# Patient Record
Sex: FEMALE | Race: WHITE | NOT HISPANIC OR LATINO | Employment: OTHER
[De-identification: names, ages, dates, MRNs, and addresses within clinical notes are randomized per-mention and may not be internally consistent; named-entity substitution may affect disease eponyms.]

---

## 2017-02-08 ENCOUNTER — HOSPITAL (OUTPATIENT)
Dept: OTHER | Age: 57
End: 2017-02-08
Attending: FAMILY MEDICINE

## 2017-03-06 ENCOUNTER — OFFICE VISIT (OUTPATIENT)
Dept: FAMILY MEDICINE CLINIC | Facility: CLINIC | Age: 57
End: 2017-03-06

## 2017-03-06 VITALS
DIASTOLIC BLOOD PRESSURE: 40 MMHG | WEIGHT: 107 LBS | HEIGHT: 63.98 IN | HEART RATE: 78 BPM | SYSTOLIC BLOOD PRESSURE: 100 MMHG | BODY MASS INDEX: 18.27 KG/M2

## 2017-03-06 DIAGNOSIS — K13.70 MOUTH LESION: ICD-10-CM

## 2017-03-06 DIAGNOSIS — E89.0 HISTORY OF PARTIAL THYROIDECTOMY: ICD-10-CM

## 2017-03-06 DIAGNOSIS — Z00.00 WELL FEMALE EXAM WITHOUT GYNECOLOGICAL EXAM: Primary | ICD-10-CM

## 2017-03-06 DIAGNOSIS — N95.1 PERIMENOPAUSAL: ICD-10-CM

## 2017-03-06 DIAGNOSIS — F41.1 GAD (GENERALIZED ANXIETY DISORDER): ICD-10-CM

## 2017-03-06 DIAGNOSIS — Z00.00 LABORATORY EXAMINATION ORDERED AS PART OF A ROUTINE GENERAL MEDICAL EXAMINATION: ICD-10-CM

## 2017-03-06 PROBLEM — H60.549 ECZEMA OF EXTERNAL EAR: Status: ACTIVE | Noted: 2017-03-06

## 2017-03-06 PROCEDURE — 99396 PREV VISIT EST AGE 40-64: CPT | Performed by: FAMILY MEDICINE

## 2017-03-06 PROCEDURE — 99213 OFFICE O/P EST LOW 20 MIN: CPT | Performed by: FAMILY MEDICINE

## 2017-03-06 RX ORDER — PREDNISONE 10 MG/1
TABLET ORAL
COMMUNITY
Start: 2017-01-09 | End: 2017-03-06 | Stop reason: ALTCHOICE

## 2017-03-06 RX ORDER — PREDNISONE 20 MG/1
TABLET ORAL
COMMUNITY
Start: 2016-12-18 | End: 2017-03-06 | Stop reason: ALTCHOICE

## 2017-03-06 RX ORDER — BUSPIRONE HYDROCHLORIDE 10 MG/1
TABLET ORAL
Qty: 90 TABLET | Refills: 1 | Status: SHIPPED | OUTPATIENT
Start: 2017-03-06 | End: 2017-06-05

## 2017-03-06 RX ORDER — HYDROCODONE BITARTRATE AND ACETAMINOPHEN 5; 325 MG/1; MG/1
TABLET ORAL
COMMUNITY
Start: 2017-01-09 | End: 2017-03-06

## 2017-03-06 RX ORDER — NEOMYCIN SULFATE, POLYMYXIN B SULFATE, AND DEXAMETHASONE 3.5; 10000; 1 MG/G; [USP'U]/G; MG/G
OINTMENT OPHTHALMIC
COMMUNITY
Start: 2016-12-18 | End: 2017-03-06

## 2017-03-06 NOTE — PROGRESS NOTES
HPI:   Juana Loya is a 65year old female who presents for a complete physical exam. Symptoms: is menopausal.   Patient complains of congestion related to allergies. She has not been taking any antihistamines. Also feeling anxious and irritable.  HAN POTASSIUM 4.1 3.5 - 5.3 mmol/L   CHLORIDE 103 98 - 110 mmol/L   CARBON DIOXIDE 27 19 - 30 mmol/L   CALCIUM 9.5 8.6 - 10.4 mg/dL   PROTEIN, TOTAL 6.9 6.1 - 8.1 g/dL   ALBUMIN 4.6 3.6 - 5.1 g/dL   GLOBULIN 2.3 1.9 - 3.7 g/dL (calc)   ALBUMIN/GLOBULIN RATIO Hydro Thermal Uterine Ablation; [facility] [place], [place]    SCREENING PAP SMEAR BY PHYS  Approx. 2013    MAMMOGRAM, BOTH BREASTS  Approx.  2013      Family History   Problem Relation Age of Onset   • Liver Failure [Other] [OTHER] Father    • Can nourished and in no apparent distress  SKIN: no rashes,no suspicious lesions  HEENT: atraumatic, normocephalic,ears, nose and throat are normal, mouth with aprox 0.5 cm oval dark macule, unable to remove and non-painful  EYES: PERRLA, EOMI, sclera, conjunc 3-4 times weekly. 2. HAN (generalized anxiety disorder)  BuSpar 5 mg twice daily for 1 week then increase to 10 mg twice daily and if needed after another week increase to 50 mg twice daily for anxiety. Follow-up with update through my chart.   Patient

## 2017-03-06 NOTE — PROGRESS NOTES
An \"Authorization to Use and Disclose Health Information\" was successfully faxed to [address] [place] [address] in order to request copies of her radiology reports. Reta Bumpers is specifically looking for the mammogram and bone density reports, which she had ordered.

## 2017-03-15 ENCOUNTER — TELEPHONE (OUTPATIENT)
Dept: FAMILY MEDICINE CLINIC | Facility: CLINIC | Age: 57
End: 2017-03-15

## 2017-03-15 NOTE — TELEPHONE ENCOUNTER
A message was left on the patient's confidential voicemail notifying the patient that her mammogram was normal and should be repeated in one year. The patient was instructed to call back with any questions.

## 2017-03-18 LAB
ABSOLUTE BASOPHILS: 13 CELLS/UL (ref 0–200)
ABSOLUTE EOSINOPHILS: 142 CELLS/UL (ref 15–500)
ABSOLUTE LYMPHOCYTES: 1380 CELLS/UL (ref 850–3900)
ABSOLUTE MONOCYTES: 258 CELLS/UL (ref 200–950)
ABSOLUTE NEUTROPHILS: 2507 CELLS/UL (ref 1500–7800)
ALBUMIN/GLOBULIN RATIO: 1.8 (CALC) (ref 1–2.5)
ALBUMIN: 4.6 G/DL (ref 3.6–5.1)
ALKALINE PHOSPHATASE: 71 U/L (ref 33–130)
ALT: 17 U/L (ref 6–29)
AST: 20 U/L (ref 10–35)
BASOPHILS: 0.3 %
BILIRUBIN, TOTAL: 0.4 MG/DL (ref 0.2–1.2)
BUN: 20 MG/DL (ref 7–25)
CALCIUM: 9.7 MG/DL (ref 8.6–10.4)
CARBON DIOXIDE: 29 MMOL/L (ref 20–31)
CHLORIDE: 105 MMOL/L (ref 98–110)
CHOL/HDLC RATIO: 2.9 (CALC)
CHOLESTEROL, TOTAL: 204 MG/DL (ref 125–200)
CREATININE: 0.75 MG/DL (ref 0.5–1.05)
EGFR IF AFRICN AM: 103 ML/MIN/1.73M2
EGFR IF NONAFRICN AM: 89 ML/MIN/1.73M2
EOSINOPHILS: 3.3 %
FSH: 89.5 MIU/ML
GLOBULIN: 2.5 G/DL (CALC) (ref 1.9–3.7)
GLUCOSE: 83 MG/DL (ref 65–99)
HDL CHOLESTEROL: 70 MG/DL
HEMATOCRIT: 39.4 % (ref 35–45)
HEMOGLOBIN: 13.2 G/DL (ref 11.7–15.5)
LDL-CHOLESTEROL: 116 MG/DL (CALC)
LH: 36.3 MIU/ML
LYMPHOCYTES: 32.1 %
MCH: 30.9 PG (ref 27–33)
MCHC: 33.5 G/DL (ref 32–36)
MCV: 92.5 FL (ref 80–100)
MONOCYTES: 6 %
MPV: 7.6 FL (ref 7.5–12.5)
NEUTROPHILS: 58.3 %
NON-HDL CHOLESTEROL: 134 MG/DL (CALC)
PLATELET COUNT: 210 THOUSAND/UL (ref 140–400)
POTASSIUM: 4 MMOL/L (ref 3.5–5.3)
PROTEIN, TOTAL: 7.1 G/DL (ref 6.1–8.1)
RDW: 13.6 % (ref 11–15)
RED BLOOD CELL COUNT: 4.26 MILLION/UL (ref 3.8–5.1)
SODIUM: 141 MMOL/L (ref 135–146)
T3, TOTAL: 96 NG/DL (ref 76–181)
T4, FREE: 1.2 NG/DL (ref 0.8–1.8)
TRIGLYCERIDES: 88 MG/DL
TSH: 2.58 MIU/L (ref 0.4–4.5)
VITAMIN D, 25-OH, TOTAL: 23 NG/ML (ref 30–100)
WHITE BLOOD CELL COUNT: 4.3 THOUSAND/UL (ref 3.8–10.8)

## 2017-03-18 NOTE — PROGRESS NOTES
Quick Note:    Lab results showed low Vitamin D. Advise RX vitamin D 50,000 IU once weekly for 12 weeks. Recheck vitamin D level in 3 months. After labs we will direct you on the dose of vitamin D needed OTC.   Elevated FSH and LH consistent with menopause

## 2017-03-20 ENCOUNTER — TELEPHONE (OUTPATIENT)
Dept: FAMILY MEDICINE CLINIC | Facility: CLINIC | Age: 57
End: 2017-03-20

## 2017-03-20 DIAGNOSIS — E55.9 VITAMIN D DEFICIENCY: Primary | ICD-10-CM

## 2017-03-20 RX ORDER — ERGOCALCIFEROL 1.25 MG/1
50000 CAPSULE ORAL WEEKLY
Qty: 12 CAPSULE | Refills: 0 | Status: SHIPPED | OUTPATIENT
Start: 2017-03-20 | End: 2017-06-18

## 2017-03-20 NOTE — TELEPHONE ENCOUNTER
----- Message from Idania Shafer PA-C sent at 3/18/2017  2:29 PM CDT -----  Lab results showed low Vitamin D. Advise RX vitamin D 50,000 IU once weekly for 12 weeks. Recheck vitamin D level in 3 months.   After labs we will direct you on the dose of vi

## 2017-06-05 ENCOUNTER — TELEPHONE (OUTPATIENT)
Dept: FAMILY MEDICINE CLINIC | Facility: CLINIC | Age: 57
End: 2017-06-05

## 2017-06-05 RX ORDER — BUSPIRONE HYDROCHLORIDE 10 MG/1
TABLET ORAL
Qty: 120 TABLET | Refills: 0 | Status: SHIPPED | OUTPATIENT
Start: 2017-06-05 | End: 2017-10-26

## 2017-06-05 NOTE — TELEPHONE ENCOUNTER
Spoke to patient and she said that she has been on the med and will need a refill. But pt feels that the med has not been helping at all. Feels more like a\" placebo\" she said her symptoms are the same. Her last OV was in March 2017.   Did explain that

## 2017-06-05 NOTE — TELEPHONE ENCOUNTER
Spoke to patient and she has been taking 15mg BID so she will go up to 20mg BID and if that dose not effective she will call office to schedule an appt.  Script sent to pharmacy

## 2017-07-11 NOTE — PATIENT INSTRUCTIONS
SCHEDULING [facility] LAB APPOINTMENTS ONLINE    Lab appointments can now be scheduled online at [email or web address]    · Go to [email or web address]  · In Search type Lab  · Click \"Lab services\"  · Click \"Schedule Your Test Online\"  · Follow the prompts  · If you another form of therapy as your treatment needs change. This may mean meeting with a therapist by yourself or in a group.  Therapy can also help you work through problems in your life, such as drug or alcohol dependence, that may be making your anxiety wors take action. But, for some people, anxiety gets so bad it causes problems in daily life. If you find yourself in a constant state of anxiety, you may have an anxiety disorder called generalized anxiety disorder (HAN).  Speak with your doctor or mental healt

## 2017-07-11 NOTE — PROGRESS NOTES
Ita Messina is a 65year old female. HPI:   Patient is in for feeling anxious all the time and irritable had tried BuSpar was up to 20 mg twice a day with no relief.   In the past did use Effexor years ago but was hoping to avoid going on another daily exertion  CARDIOVASCULAR: denies chest pain on exertion  GI: denies abdominal pain and denies heartburn  NEURO: denies headaches  Musculoskeletal: No motor deficits  Psych see above  EXAM:   BP 90/66 (BP Location: Left arm, Patient Position: Sitting, Cuff live over an hour away and would like to communicate via my chart as to how she is feeling the first 2 months. Reviewed benefits and side effects at length. The patient indicates understanding of these issues and agrees to the plan.   The patient is ask

## 2017-07-14 ENCOUNTER — TELEPHONE (OUTPATIENT)
Dept: FAMILY MEDICINE CLINIC | Facility: CLINIC | Age: 57
End: 2017-07-14

## 2017-07-14 NOTE — TELEPHONE ENCOUNTER
A message was left on the patient's confidential voicemail notifying her that her Vitamin D was great at 46. She was advised to take Vitamin D 1,000IU daily OTC in order to maintain her level. The patient was instructed to call back with any questions.

## 2017-10-09 RX ORDER — ESCITALOPRAM OXALATE 10 MG/1
TABLET ORAL
Qty: 30 TABLET | Refills: 2 | Status: SHIPPED
Start: 2017-10-09 | End: 2017-10-26

## 2017-10-09 RX ORDER — ESCITALOPRAM OXALATE 10 MG/1
TABLET ORAL
Qty: 30 TABLET | Refills: 0 | Status: SHIPPED | OUTPATIENT
Start: 2017-10-09 | End: 2017-10-26

## 2017-10-09 NOTE — TELEPHONE ENCOUNTER
From: Brenda Boyd  Sent: 10/9/2017 10:01 AM CDT  Subject: Medication Renewal Request    Brenda Boyd would like a refill of the following medications:     escitalopram 10 MG Oral Tab Angeline Antony PA-C]   Patient Comment: Stephanie Craias, you had ask

## 2017-10-09 NOTE — TELEPHONE ENCOUNTER
lexapro approved qty 30 NR  Patient due for a 3 month f/u  Please call to schedule appt-will need for further refills  [phone] (home)

## 2018-02-16 ENCOUNTER — PATIENT MESSAGE (OUTPATIENT)
Dept: FAMILY MEDICINE CLINIC | Facility: CLINIC | Age: 58
End: 2018-02-16

## 2018-02-18 NOTE — TELEPHONE ENCOUNTER
----- Message from Marlen Sanders sent at 2/16/2018  2:43 PM CST -----  Regarding: Prescription Question  Contact: [phone]  Elisha Triplett,    I'm down to my last few days on the Sertraline and while the existing script has one refill, it seems like this

## 2018-04-06 NOTE — PATIENT INSTRUCTIONS
SCHEDULING [facility] APPOINTMENTS ONLINE    Lab appointments can now be scheduled online at [email or web address]    · Go to [email or web address]  · In Search type Lab  · Click \"Lab services\"  · Click \"Schedule Your Test Online\"  · Follow the prompts  · If you an antibiotic eye drops or ointment, artificial tears, and/or steroid eye drops. Follow all instructions for using these medicines. Use all medicines as directed. If you have pain, take medicine as advised by the healthcare provider.   · Wash your hands car blood) from the eyelid  · Fever of 100.4ºF (38ºC) or higher, or as directed by your healthcare provider  Date Last Reviewed: 10/9/2015  © 5226-4803 [employer]. [facility] [facility], [address] [place] [address]. All rights reserved.  This information

## 2018-04-07 PROBLEM — Z86.69: Status: ACTIVE | Noted: 2018-04-07

## 2018-04-13 ENCOUNTER — TELEPHONE (OUTPATIENT)
Dept: FAMILY MEDICINE CLINIC | Facility: CLINIC | Age: 58
End: 2018-04-13

## 2018-04-13 NOTE — TELEPHONE ENCOUNTER
An \"Authorization to Use and Disclose Health Information\" requesting a copy of the patient's colonoscopy report was successfully faxed to [facility]. Anyi Andrade MD, MPH at [phone].

## 2018-05-15 ENCOUNTER — HOSPITAL (OUTPATIENT)
Dept: OTHER | Age: 58
End: 2018-05-15
Attending: FAMILY MEDICINE

## 2018-09-11 ENCOUNTER — OFFICE VISIT (OUTPATIENT)
Dept: FAMILY MEDICINE CLINIC | Facility: CLINIC | Age: 58
End: 2018-09-11
Payer: COMMERCIAL

## 2018-09-11 VITALS
HEIGHT: 63.82 IN | BODY MASS INDEX: 20.22 KG/M2 | HEART RATE: 80 BPM | SYSTOLIC BLOOD PRESSURE: 92 MMHG | DIASTOLIC BLOOD PRESSURE: 62 MMHG | WEIGHT: 117 LBS

## 2018-09-11 DIAGNOSIS — M85.852 OSTEOPENIA OF LEFT HIP: ICD-10-CM

## 2018-09-11 DIAGNOSIS — Z00.00 LABORATORY EXAMINATION ORDERED AS PART OF A ROUTINE GENERAL MEDICAL EXAMINATION: ICD-10-CM

## 2018-09-11 DIAGNOSIS — Z78.0 POST-MENOPAUSAL: ICD-10-CM

## 2018-09-11 DIAGNOSIS — Z12.4 SCREENING FOR MALIGNANT NEOPLASM OF CERVIX: ICD-10-CM

## 2018-09-11 DIAGNOSIS — Z79.899 MEDICATION MANAGEMENT: ICD-10-CM

## 2018-09-11 DIAGNOSIS — Z01.419 WELL FEMALE EXAM WITH ROUTINE GYNECOLOGICAL EXAM: Primary | ICD-10-CM

## 2018-09-11 DIAGNOSIS — F41.1 GAD (GENERALIZED ANXIETY DISORDER): ICD-10-CM

## 2018-09-11 PROCEDURE — 99213 OFFICE O/P EST LOW 20 MIN: CPT | Performed by: FAMILY MEDICINE

## 2018-09-11 PROCEDURE — 88175 CYTOPATH C/V AUTO FLUID REDO: CPT | Performed by: FAMILY MEDICINE

## 2018-09-11 PROCEDURE — 99396 PREV VISIT EST AGE 40-64: CPT | Performed by: FAMILY MEDICINE

## 2018-09-11 PROCEDURE — 87624 HPV HI-RISK TYP POOLED RSLT: CPT | Performed by: FAMILY MEDICINE

## 2018-09-11 RX ORDER — BIOTIN 1 MG
1 TABLET ORAL DAILY
COMMUNITY

## 2018-09-11 RX ORDER — SERTRALINE HYDROCHLORIDE 100 MG/1
100 TABLET, FILM COATED ORAL DAILY
Qty: 90 TABLET | Refills: 1 | Status: SHIPPED | OUTPATIENT
Start: 2018-09-11 | End: 2019-03-11

## 2018-09-11 NOTE — PROGRESS NOTES
HPI:   Marleny Ramsey is a 63year old female who presents for a complete physical exam. Symptoms: denies discharge, itching, burning or dysuria, is menopausal.  Here for follow-up on generalized anxiety disorder needing a refill on medication.   Taking ser HEMOGLOBIN 13.2 11.7 - 15.5 g/dL    HEMATOCRIT 39.4 35.0 - 45.0 %    MCV 92.5 80.0 - 100.0 fL    MCH 30.9 27.0 - 33.0 pg    MCHC 33.5 32.0 - 36.0 g/dL    RDW 13.6 11.0 - 15.0 %    PLATELET COUNT 944 494 - 400 Thousand/uL    MPV 7.6 7.5 - 12.5 fL    ABSO 0.8 - 1.8 ng/dL          Current Outpatient Medications:  Cholecalciferol (VITAMIN D3) 1000 units Oral Cap Take 1 capsule by mouth daily. Disp:  Rfl:    Sertraline HCl 100 MG Oral Tab Take 1 tablet (100 mg total) by mouth daily.  Disp: 90 tablet Rfl: 1   Nu Tobacco Use      Smoking status: Former Smoker        Types: Cigarettes        Quit date: 1990        Years since quittin.7      Smokeless tobacco: Never Used    Alcohol use:  Yes      Alcohol/week: 4.8 - 7.2 oz      Types: 8 - 12 Glasses of wine p auscultation bilateral, no rales, rhonchi or wheezing  CARDIO: RRR without murmur normal S1S2  ABD:  normal bowel sounds,soft, non tender, no masses, HSM or tenderness  :external labia, introitus and vagina are normal, normal discharge and normal cervix. or any homicidal or suicidal symptoms.   - Sertraline HCl 100 MG Oral Tab; Take 1 tablet (100 mg total) by mouth daily. Dispense: 90 tablet; Refill: 1    3. Post-menopausal  - XR DEXA BONE DENSITOMETRY (CPT=77080);  Future  - VITAMIN D, 25-HYDROXY    4. Os

## 2018-09-12 PROBLEM — Z78.0 POST-MENOPAUSAL: Status: ACTIVE | Noted: 2018-09-12

## 2018-09-12 PROBLEM — K13.70 MOUTH LESION: Status: RESOLVED | Noted: 2017-03-06 | Resolved: 2018-09-12

## 2018-09-12 PROBLEM — M85.852 OSTEOPENIA OF LEFT HIP: Status: ACTIVE | Noted: 2018-09-12

## 2018-09-12 LAB — HPV I/H RISK 1 DNA SPEC QL NAA+PROBE: NEGATIVE

## 2018-09-14 NOTE — PROGRESS NOTES
Pap smear is normal there are some endometrial cells that look benign if any vaginal bleeding would advise pelvic ultrasound call office if any vaginal bleeding occurs.     Sent to my chart

## 2018-09-27 LAB
ABSOLUTE BASOPHILS: 19 CELLS/UL (ref 0–200)
ABSOLUTE EOSINOPHILS: 111 CELLS/UL (ref 15–500)
ABSOLUTE LYMPHOCYTES: 1240 CELLS/UL (ref 850–3900)
ABSOLUTE MONOCYTES: 374 CELLS/UL (ref 200–950)
ABSOLUTE NEUTROPHILS: 1957 CELLS/UL (ref 1500–7800)
ALBUMIN/GLOBULIN RATIO: 1.9 (CALC) (ref 1–2.5)
ALBUMIN: 4.6 G/DL (ref 3.6–5.1)
ALKALINE PHOSPHATASE: 79 U/L (ref 33–130)
ALT: 33 U/L (ref 6–29)
AST: 33 U/L (ref 10–35)
BASOPHILS: 0.5 %
BILIRUBIN, TOTAL: 0.6 MG/DL (ref 0.2–1.2)
BUN: 20 MG/DL (ref 7–25)
CALCIUM: 9.8 MG/DL (ref 8.6–10.4)
CARBON DIOXIDE: 30 MMOL/L (ref 20–32)
CHLORIDE: 106 MMOL/L (ref 98–110)
CHOL/HDLC RATIO: 4.1 (CALC)
CHOLESTEROL, TOTAL: 294 MG/DL
CREATININE: 0.64 MG/DL (ref 0.5–1.05)
EGFR IF AFRICN AM: 115 ML/MIN/1.73M2
EGFR IF NONAFRICN AM: 99 ML/MIN/1.73M2
EOSINOPHILS: 3 %
GLOBULIN: 2.4 G/DL (CALC) (ref 1.9–3.7)
GLUCOSE: 98 MG/DL (ref 65–99)
HDL CHOLESTEROL: 71 MG/DL
HEMATOCRIT: 37.4 % (ref 35–45)
HEMOGLOBIN: 12.7 G/DL (ref 11.7–15.5)
LDL-CHOLESTEROL: 200 MG/DL (CALC)
LYMPHOCYTES: 33.5 %
MCH: 31 PG (ref 27–33)
MCHC: 34 G/DL (ref 32–36)
MCV: 91.2 FL (ref 80–100)
MONOCYTES: 10.1 %
MPV: 9.2 FL (ref 7.5–12.5)
NEUTROPHILS: 52.9 %
NON-HDL CHOLESTEROL: 223 MG/DL (CALC)
PLATELET COUNT: 204 THOUSAND/UL (ref 140–400)
POTASSIUM: 4.7 MMOL/L (ref 3.5–5.3)
PROTEIN, TOTAL: 7 G/DL (ref 6.1–8.1)
RDW: 13 % (ref 11–15)
RED BLOOD CELL COUNT: 4.1 MILLION/UL (ref 3.8–5.1)
SODIUM: 143 MMOL/L (ref 135–146)
TRIGLYCERIDES: 104 MG/DL
TSH: 2.43 MIU/L (ref 0.4–4.5)
VITAMIN D, 25-OH, TOTAL: 45 NG/ML (ref 30–100)
WHITE BLOOD CELL COUNT: 3.7 THOUSAND/UL (ref 3.8–10.8)

## 2018-09-27 NOTE — PROGRESS NOTES
Follow-up in the office to discuss test results cholesterol and LDL are very elevated.   Ultrafast CT if not done in the last 5 years  Thyroid and vitamin D normal.  Elevated ALT will need a repeat in 1-2 months depending on if cholesterol medication is sta

## 2018-10-01 ENCOUNTER — HOSPITAL (OUTPATIENT)
Dept: OTHER | Age: 58
End: 2018-10-01
Attending: FAMILY MEDICINE

## 2018-10-02 ENCOUNTER — PATIENT MESSAGE (OUTPATIENT)
Dept: FAMILY MEDICINE CLINIC | Facility: CLINIC | Age: 58
End: 2018-10-02

## 2018-10-02 DIAGNOSIS — E78.5 HYPERLIPIDEMIA, UNSPECIFIED HYPERLIPIDEMIA TYPE: Primary | ICD-10-CM

## 2018-10-05 RX ORDER — ROSUVASTATIN CALCIUM 5 MG/1
5 TABLET, COATED ORAL NIGHTLY
Qty: 90 TABLET | Refills: 0 | Status: SHIPPED | OUTPATIENT
Start: 2018-10-05 | End: 2018-12-27

## 2018-10-05 NOTE — TELEPHONE ENCOUNTER
Regarding: RE: Test Results Question  Contact: [phone]  ----- Message from Generic, Mychart sent at 10/5/2018  8:07 AM CDT -----    Nannette Buerger,    I have not yet been contacted. Should I call someone?  Should I also schedule the scan mentioned in your

## 2018-10-05 NOTE — PROGRESS NOTES
Follow-up in the office to discuss test results cholesterol and LDL are very elevated.    Ultrafast CT if not done in the last 5 years   Thyroid and vitamin D normal.   Elevated ALT will need a repeat in 1-2 months depending on if cholesterol medication is

## 2018-10-11 ENCOUNTER — PATIENT MESSAGE (OUTPATIENT)
Dept: FAMILY MEDICINE CLINIC | Facility: CLINIC | Age: 58
End: 2018-10-11

## 2018-10-11 NOTE — TELEPHONE ENCOUNTER
Per conversation with a representative of [facility]'s Medical Records Department, a faxed request for the Bone Density results was successfully faxed to the Medical Records Department's attention at [phone].

## 2018-11-07 NOTE — PROGRESS NOTES
A message was left on the patient's confidential cell and home voicemails notifying her that she can call back with any questions regarding her results or to discuss her results in greater detail.

## 2018-11-17 ENCOUNTER — HOSPITAL ENCOUNTER (OUTPATIENT)
Dept: CT IMAGING | Facility: HOSPITAL | Age: 58
Discharge: HOME | End: 2018-11-17
Attending: FAMILY MEDICINE

## 2018-11-17 DIAGNOSIS — Z13.6 SCREENING FOR CARDIOVASCULAR CONDITION: ICD-10-CM

## 2018-11-20 NOTE — PROGRESS NOTES
CT scan over read by radiology shows mild scarring in the right middle lobe of the lung probably from old infection. No treatment needed.   Sent to my chart

## 2018-12-01 ENCOUNTER — MED REC SCAN ONLY (OUTPATIENT)
Dept: FAMILY MEDICINE CLINIC | Facility: CLINIC | Age: 58
End: 2018-12-01

## 2018-12-08 NOTE — TELEPHONE ENCOUNTER
Lipids are much better continue with the Crestor 5 mg.   Repeat the lipids and liver testing in 6 months  Order future tests/medications sent to my chart

## 2018-12-11 ENCOUNTER — TELEPHONE (OUTPATIENT)
Dept: FAMILY MEDICINE CLINIC | Facility: CLINIC | Age: 58
End: 2018-12-11

## 2018-12-11 DIAGNOSIS — E78.5 HYPERLIPIDEMIA, UNSPECIFIED HYPERLIPIDEMIA TYPE: Primary | ICD-10-CM

## 2018-12-11 NOTE — TELEPHONE ENCOUNTER
----- Message from Kandi Lopez PA-C sent at 12/8/2018 12:49 PM CST -----  Lipids are much better continue with the Crestor 5 mg.   Repeat the lipids and liver testing in 6 months  Order future tests/medications sent to my chart    Labs in for 6 months

## 2018-12-27 RX ORDER — ROSUVASTATIN CALCIUM 5 MG/1
TABLET, COATED ORAL
Qty: 90 TABLET | Refills: 0 | Status: SHIPPED | OUTPATIENT
Start: 2018-12-27 | End: 2019-03-30

## 2019-03-11 DIAGNOSIS — F41.1 GAD (GENERALIZED ANXIETY DISORDER): ICD-10-CM

## 2019-03-11 RX ORDER — SERTRALINE HYDROCHLORIDE 100 MG/1
TABLET, FILM COATED ORAL
Qty: 30 TABLET | Refills: 0 | Status: SHIPPED | OUTPATIENT
Start: 2019-03-11 | End: 2019-04-18

## 2019-03-11 NOTE — TELEPHONE ENCOUNTER
Sertraline approved qty 30 NR  No future appointments.   Patient due for 6 month f/u for HAN  Please call to schedule appt-will need for further refills  [phone] (home)

## 2019-03-31 RX ORDER — ROSUVASTATIN CALCIUM 5 MG/1
TABLET, COATED ORAL
Qty: 90 TABLET | Refills: 0 | Status: SHIPPED | OUTPATIENT
Start: 2019-03-31 | End: 2019-04-18

## 2019-04-18 PROBLEM — E78.2 MIXED HYPERLIPIDEMIA: Status: ACTIVE | Noted: 2019-04-18

## 2019-04-18 PROBLEM — H60.543 DERMATITIS OF EAR CANAL, BILATERAL: Status: ACTIVE | Noted: 2019-04-18

## 2019-04-18 NOTE — PROGRESS NOTES
Gege Shah is a 63year old female. HPI:   #1 HAN  Patient overall is doing well with the sertraline a fisted make her tired and now feels the fatigue has improved.   States she thought about coming down on it and did do a few days at the 50 mg got s mg total) by mouth once daily. Disp: 90 tablet Rfl: 1   Rosuvastatin Calcium 5 MG Oral Tab TAKE 1 TABLET BY MOUTH NIGHTLY Disp: 90 tablet Rfl: 1   triamcinolone acetonide 0.1 % External Ointment Apply 1 Application topically 2 (two) times daily.  Disp: 30 g adenopathy, thyroid normal to palpation  LUNGS: clear to auscultation no rales, rhonchi or wheezes  CARDIO: RRR without murmur  EXTREMITIES: no cyanosis, clubbing or edema  Musculoskeletal: No gross deficit  Neurological: nerves II through XII grossly [place] TAKE 1 TABLET BY MOUTH NIGHTLY  Dispense: 90 tablet; Refill: 1    3. Eczema of both external ears  Conservative use of steroid cream avoid Q-tips  - triamcinolone acetonide 0.1 % External Ointment; Apply 1 Application topically 2 (two) times daily.   Camilo Mckeon

## 2019-08-21 ENCOUNTER — HOSPITAL (OUTPATIENT)
Dept: OTHER | Age: 59
End: 2019-08-21
Attending: FAMILY MEDICINE

## 2019-12-30 DIAGNOSIS — E78.2 MIXED HYPERLIPIDEMIA: ICD-10-CM

## 2019-12-30 RX ORDER — ROSUVASTATIN CALCIUM 5 MG/1
TABLET, COATED ORAL
Qty: 30 TABLET | Refills: 0 | Status: SHIPPED | OUTPATIENT
Start: 2019-12-30 | End: 2020-01-27

## 2019-12-30 NOTE — TELEPHONE ENCOUNTER
Pt requesting refill of ROSUVASTATIN CALCIUM 5 MG Oral Tab, refill approved, sent to pharmacy with message to make appointment  Last Time Medication was Filled:  4/18/19    Last Office Visit with PCP: 4/18/19.   The patient is asked to return in every 6 m

## 2020-01-24 ENCOUNTER — PATIENT MESSAGE (OUTPATIENT)
Dept: FAMILY MEDICINE CLINIC | Facility: CLINIC | Age: 60
End: 2020-01-24

## 2020-01-24 DIAGNOSIS — E78.2 MIXED HYPERLIPIDEMIA: ICD-10-CM

## 2020-01-24 RX ORDER — ROSUVASTATIN CALCIUM 5 MG/1
TABLET, COATED ORAL
Qty: 30 TABLET | Refills: 0 | OUTPATIENT
Start: 2020-01-24

## 2020-01-24 RX ORDER — VALACYCLOVIR HYDROCHLORIDE 1 G/1
TABLET, FILM COATED ORAL
Qty: 4 TABLET | Refills: 0 | Status: SHIPPED | OUTPATIENT
Start: 2020-01-24 | End: 2020-02-14

## 2020-01-24 NOTE — TELEPHONE ENCOUNTER
Pt requesting refill of VALACYCLOVIR. Passed protocol, refill approved, sent to pharmacy:     Refill request for ROSUVASTATIN. Denied at this time with message to call office. Attempted to call patient but mailbox full. Last labs completed 12/7/18.

## 2020-01-27 RX ORDER — ROSUVASTATIN CALCIUM 5 MG/1
5 TABLET, COATED ORAL NIGHTLY
Qty: 30 TABLET | Refills: 0 | Status: SHIPPED | OUTPATIENT
Start: 2020-01-27 | End: 2020-02-14

## 2020-01-27 NOTE — TELEPHONE ENCOUNTER
From: Karmen Aguiar  To: Herb Delvalle PA-C  Sent: 1/24/2020 3:39 PM CST  Subject: Prescription Question    Yao Mauricio! I just received word that a refill of Rosuvastatin has been denied and I am assuming this is because I have been AWOL?  I ju

## 2020-02-14 ENCOUNTER — OFFICE VISIT (OUTPATIENT)
Dept: FAMILY MEDICINE CLINIC | Facility: CLINIC | Age: 60
End: 2020-02-14
Payer: COMMERCIAL

## 2020-02-14 VITALS
HEIGHT: 64.09 IN | HEART RATE: 76 BPM | DIASTOLIC BLOOD PRESSURE: 60 MMHG | BODY MASS INDEX: 20.66 KG/M2 | WEIGHT: 121 LBS | SYSTOLIC BLOOD PRESSURE: 102 MMHG

## 2020-02-14 DIAGNOSIS — Z00.00 WELL FEMALE EXAM WITHOUT GYNECOLOGICAL EXAM: Primary | ICD-10-CM

## 2020-02-14 DIAGNOSIS — Z00.00 LABORATORY TESTS ORDERED AS PART OF A COMPLETE PHYSICAL EXAM (CPE): ICD-10-CM

## 2020-02-14 DIAGNOSIS — Z13.820 OSTEOPOROSIS SCREENING: ICD-10-CM

## 2020-02-14 DIAGNOSIS — M85.89 OSTEOPENIA OF MULTIPLE SITES: ICD-10-CM

## 2020-02-14 DIAGNOSIS — E78.2 MIXED HYPERLIPIDEMIA: ICD-10-CM

## 2020-02-14 DIAGNOSIS — F41.1 GAD (GENERALIZED ANXIETY DISORDER): ICD-10-CM

## 2020-02-14 DIAGNOSIS — Z79.899 MEDICATION MANAGEMENT: ICD-10-CM

## 2020-02-14 DIAGNOSIS — B00.1 COLD SORE: ICD-10-CM

## 2020-02-14 DIAGNOSIS — Z12.31 ENCOUNTER FOR SCREENING MAMMOGRAM FOR MALIGNANT NEOPLASM OF BREAST: ICD-10-CM

## 2020-02-14 DIAGNOSIS — R92.2 DENSE BREAST TISSUE ON MAMMOGRAM: ICD-10-CM

## 2020-02-14 DIAGNOSIS — R04.0 NASAL BLEEDING: ICD-10-CM

## 2020-02-14 PROCEDURE — 99396 PREV VISIT EST AGE 40-64: CPT | Performed by: FAMILY MEDICINE

## 2020-02-14 PROCEDURE — 99213 OFFICE O/P EST LOW 20 MIN: CPT | Performed by: FAMILY MEDICINE

## 2020-02-14 RX ORDER — VALACYCLOVIR HYDROCHLORIDE 1 G/1
TABLET, FILM COATED ORAL
Qty: 20 TABLET | Refills: 3 | Status: SHIPPED | OUTPATIENT
Start: 2020-02-14 | End: 2021-08-17

## 2020-02-14 RX ORDER — ROSUVASTATIN CALCIUM 5 MG/1
5 TABLET, COATED ORAL NIGHTLY
Qty: 90 TABLET | Refills: 3 | Status: SHIPPED | OUTPATIENT
Start: 2020-02-14 | End: 2021-02-11

## 2020-02-14 RX ORDER — SERTRALINE HYDROCHLORIDE 100 MG/1
TABLET, FILM COATED ORAL
Qty: 90 TABLET | Refills: 1 | Status: SHIPPED | OUTPATIENT
Start: 2020-02-14 | End: 2021-04-26

## 2020-02-14 NOTE — PROGRESS NOTES
HPI:   Sg Edmonds is a 62year old female who presents for a complete physical exam. Symptoms: denies discharge, itching, burning or dysuria, is menopausal. No vaginal bleeding    Had ablation in 2011.  Spotting after the ablation but  NO bleeding sin 111 lb (50.3 kg)    Body mass index is 20.71 kg/m².        Results for orders placed or performed in visit on 10/02/18   LIPID PANEL   Result Value Ref Range    CHOLESTEROL, TOTAL 133 <200 mg/dL    HDL CHOLESTEROL 55 >50 mg/dL    TRIGLYCERIDES 173 (H) <150 [facility] in [facility]   • COLONOSCOPY  2014    Due 2024; Kerline West M.D.   • COLONOSCOPY     • MAMMOGRAM, BOTH BREASTS  Approx.     • MAMMOGRAM, BOTH BREASTS  2017   •   ,    • OTHER CANDACE headaches, tingling or dizziness  PSYCHE: See above treated for anxiety   HEMATOLOGIC: denies bleeding abnormalities  ENDOCRINE: denies temperature intolerance, polyuria, or excessive sweating.   LYMPHATICS: denies swollen glands      EXAM:   /60 (BP Encounter      CBC With Diff      CMP      Lipid      TSH W Reflex To Free T4      Influenza Vaccine Refused (Order that documents the process)      Meds & Refills for this Visit:  Requested Prescriptions     Signed Prescriptions Disp Refills   • Sertralin (CPT=77080); Future  Calcium, vit D and weight bearing exercise  5. Nasal bleeding  Vaseline or coconut oil to the nostril with saline spray  To ENT if persists for evaluation  6.  Cold sore intermittent  - valACYclovir HCl 1 G Oral Tab; TAKE TWO TABLETS BY

## 2020-02-15 PROBLEM — Z79.899 MEDICATION MANAGEMENT: Status: ACTIVE | Noted: 2020-02-15

## 2020-02-15 PROBLEM — R92.30 DENSE BREAST TISSUE ON MAMMOGRAM: Status: ACTIVE | Noted: 2020-02-15

## 2020-02-15 PROBLEM — B00.1 COLD SORE: Status: ACTIVE | Noted: 2020-02-15

## 2020-02-15 PROBLEM — H60.549 ECZEMA OF EXTERNAL EAR: Status: RESOLVED | Noted: 2017-03-06 | Resolved: 2020-02-15

## 2020-02-15 PROBLEM — M85.89 OSTEOPENIA OF MULTIPLE SITES: Status: ACTIVE | Noted: 2020-02-15

## 2020-02-15 PROBLEM — R92.2 DENSE BREAST TISSUE ON MAMMOGRAM: Status: ACTIVE | Noted: 2020-02-15

## 2020-02-15 PROBLEM — R04.0 NASAL BLEEDING: Status: ACTIVE | Noted: 2020-02-15

## 2020-02-19 ENCOUNTER — PATIENT MESSAGE (OUTPATIENT)
Dept: FAMILY MEDICINE CLINIC | Facility: CLINIC | Age: 60
End: 2020-02-19

## 2020-02-20 NOTE — TELEPHONE ENCOUNTER
From: Oscar Roland  To: Maddie Vidal PA-C  Sent: 2/19/2020 5:20 PM CST  Subject: Prescription Question    Last time I went to [place] (April 2016), Dr. Sara Alejo prescribed vaccines for malaria and typhoid.  I understand the typhoid vac is still effe

## 2020-02-21 NOTE — TELEPHONE ENCOUNTER
Mefloquine HCl (LARIAM) 250 MG Oral Tab (Discontinued) 7 tablet 0 2/22/2016 3/6/2017   Sig:   Take 1 tablet (250 mg total) by mouth every 7 days.  Starting 1 week before travel, continuing through travel and for 4 week afterwards       Take on full stomach

## 2020-02-24 RX ORDER — MEFLOQUINE HYDROCHLORIDE 250 MG/1
250 TABLET ORAL
Qty: 7 TABLET | Refills: 0 | Status: SHIPPED | OUTPATIENT
Start: 2020-02-24 | End: 2021-08-02

## 2020-02-24 RX ORDER — CIPROFLOXACIN 500 MG/1
500 TABLET, FILM COATED ORAL 2 TIMES DAILY
Qty: 6 TABLET | Refills: 1 | Status: SHIPPED | OUTPATIENT
Start: 2020-02-24 | End: 2021-08-02

## 2020-07-16 ENCOUNTER — TELEPHONE (OUTPATIENT)
Dept: FAMILY MEDICINE CLINIC | Facility: CLINIC | Age: 60
End: 2020-07-16

## 2020-07-16 DIAGNOSIS — R79.89 ELEVATED LFTS: Primary | ICD-10-CM

## 2020-07-16 LAB
ABSOLUTE BASOPHILS: 18 CELLS/UL (ref 0–200)
ABSOLUTE EOSINOPHILS: 110 CELLS/UL (ref 15–500)
ABSOLUTE LYMPHOCYTES: 1780 CELLS/UL (ref 850–3900)
ABSOLUTE MONOCYTES: 442 CELLS/UL (ref 200–950)
ABSOLUTE NEUTROPHILS: 2249 CELLS/UL (ref 1500–7800)
ALBUMIN/GLOBULIN RATIO: 2 (CALC) (ref 1–2.5)
ALBUMIN: 4.6 G/DL (ref 3.6–5.1)
ALKALINE PHOSPHATASE: 89 U/L (ref 37–153)
ALT: 42 U/L (ref 6–29)
AST: 47 U/L (ref 10–35)
BASOPHILS: 0.4 %
BILIRUBIN, TOTAL: 0.4 MG/DL (ref 0.2–1.2)
BUN/CREATININE RATIO: 35 (CALC) (ref 6–22)
BUN: 26 MG/DL (ref 7–25)
CALCIUM: 9.7 MG/DL (ref 8.6–10.4)
CARBON DIOXIDE: 29 MMOL/L (ref 20–32)
CHLORIDE: 104 MMOL/L (ref 98–110)
CHOL/HDLC RATIO: 2.8 (CALC)
CHOLESTEROL, TOTAL: 171 MG/DL
CREATININE: 0.75 MG/DL (ref 0.5–1.05)
EGFR IF AFRICN AM: 101 ML/MIN/1.73M2
EGFR IF NONAFRICN AM: 87 ML/MIN/1.73M2
EOSINOPHILS: 2.4 %
GLOBULIN: 2.3 G/DL (CALC) (ref 1.9–3.7)
GLUCOSE: 89 MG/DL (ref 65–99)
HDL CHOLESTEROL: 61 MG/DL
HEMATOCRIT: 37.3 % (ref 35–45)
HEMOGLOBIN: 12.4 G/DL (ref 11.7–15.5)
LDL-CHOLESTEROL: 83 MG/DL (CALC)
LYMPHOCYTES: 38.7 %
MCH: 30.6 PG (ref 27–33)
MCHC: 33.2 G/DL (ref 32–36)
MCV: 92.1 FL (ref 80–100)
MONOCYTES: 9.6 %
MPV: 10.1 FL (ref 7.5–12.5)
NEUTROPHILS: 48.9 %
NON-HDL CHOLESTEROL: 110 MG/DL (CALC)
PLATELET COUNT: 195 THOUSAND/UL (ref 140–400)
POTASSIUM: 4.3 MMOL/L (ref 3.5–5.3)
PROTEIN, TOTAL: 6.9 G/DL (ref 6.1–8.1)
RDW: 13.1 % (ref 11–15)
RED BLOOD CELL COUNT: 4.05 MILLION/UL (ref 3.8–5.1)
SIGNAL TO CUT-OFF: 0.22
SODIUM: 142 MMOL/L (ref 135–146)
TRIGLYCERIDES: 167 MG/DL
TSH W/REFLEX TO FT4: 4.23 MIU/L (ref 0.4–4.5)
WHITE BLOOD CELL COUNT: 4.6 THOUSAND/UL (ref 3.8–10.8)

## 2020-07-16 NOTE — TELEPHONE ENCOUNTER
----- Message from Neisha Anne PA-C sent at 7/16/2020  8:44 AM CDT -----  CBC and thyroid are normal.  Elevated liver function tests adding a hepatitis panel.   Avoid alcohol, processed foods, fatty foods and acetaminophen repeat liver function test i

## 2020-07-16 NOTE — TELEPHONE ENCOUNTER
Per Franci Araujo PA-C, a Hepatitis Panel was added on to the patient's existing labs and an order was placed for a LFT to be done in 1 month.

## 2020-07-17 NOTE — PROGRESS NOTES
Hepatitis panel is negative. Sent to my chart. Repeat liver function test in 1 month. Sent to my chart.

## 2020-07-31 ENCOUNTER — PATIENT MESSAGE (OUTPATIENT)
Dept: FAMILY MEDICINE CLINIC | Facility: CLINIC | Age: 60
End: 2020-07-31

## 2020-07-31 NOTE — TELEPHONE ENCOUNTER
From: Verna Monet  To: Erick Alexander PA-C  Sent: 7/31/2020 1:55 PM CDT  Subject: Visit Follow-up Question    Yogesh Hector,    I hope you are well.     Two things you can help me with:    1) Marilyn is billing me a deductible for the lab work done foll

## 2020-07-31 NOTE — TELEPHONE ENCOUNTER
From: Alexis Dumas  To: Sadiq Pickering PA-C  Sent: 7/31/2020 2:11 PM CDT  Subject: Visit Follow-up Question    Hi again! (Too wordy b4.)    2) I've scheduled the follow up liver enzyme tests and have SIGNIFICANTLY reduced alcohol consumption.  By mnoty

## 2020-08-13 DIAGNOSIS — Z12.31 ENCOUNTER FOR SCREENING MAMMOGRAM FOR MALIGNANT NEOPLASM OF BREAST: Primary | ICD-10-CM

## 2020-08-24 ENCOUNTER — HOSPITAL ENCOUNTER (OUTPATIENT)
Dept: MAMMOGRAPHY | Age: 60
Discharge: HOME | End: 2020-08-24
Attending: FAMILY MEDICINE

## 2020-08-24 DIAGNOSIS — Z12.31 ENCOUNTER FOR SCREENING MAMMOGRAM FOR MALIGNANT NEOPLASM OF BREAST: ICD-10-CM

## 2020-08-24 PROCEDURE — 77063 BREAST TOMOSYNTHESIS BI: CPT

## 2020-08-28 LAB
ALBUMIN/GLOBULIN RATIO: 2 (CALC) (ref 1–2.5)
ALBUMIN: 4.5 G/DL (ref 3.6–5.1)
ALKALINE PHOSPHATASE: 80 U/L (ref 37–153)
ALT: 29 U/L (ref 6–29)
AST: 36 U/L (ref 10–35)
BILIRUBIN, DIRECT: 0.1 MG/DL
BILIRUBIN, INDIRECT: 0.4 MG/DL (CALC) (ref 0.2–1.2)
BILIRUBIN, TOTAL: 0.5 MG/DL (ref 0.2–1.2)
GLOBULIN: 2.2 G/DL (CALC) (ref 1.9–3.7)
PROTEIN, TOTAL: 6.7 G/DL (ref 6.1–8.1)

## 2020-08-28 NOTE — TELEPHONE ENCOUNTER
Improved liver function testing. Continue to try to avoid processed food, alcohol and acetaminophen. Repeat liver function tests again in 3 months.     Order future tests/medications sent to my chart

## 2020-08-31 DIAGNOSIS — R74.8 ELEVATED LIVER ENZYMES: Primary | ICD-10-CM

## 2021-02-11 DIAGNOSIS — E78.2 MIXED HYPERLIPIDEMIA: ICD-10-CM

## 2021-02-11 RX ORDER — ROSUVASTATIN CALCIUM 5 MG/1
TABLET, COATED ORAL
Qty: 90 TABLET | Refills: 0 | Status: SHIPPED | OUTPATIENT
Start: 2021-02-11 | End: 2021-05-20

## 2021-04-23 ENCOUNTER — PATIENT MESSAGE (OUTPATIENT)
Dept: FAMILY MEDICINE CLINIC | Facility: CLINIC | Age: 61
End: 2021-04-23

## 2021-04-23 DIAGNOSIS — F41.1 GAD (GENERALIZED ANXIETY DISORDER): ICD-10-CM

## 2021-04-23 RX ORDER — SERTRALINE HYDROCHLORIDE 100 MG/1
TABLET, FILM COATED ORAL
Qty: 90 TABLET | Refills: 0 | OUTPATIENT
Start: 2021-04-23

## 2021-04-23 NOTE — TELEPHONE ENCOUNTER
Requested Prescriptions     Refused Prescriptions Disp Refills   • SERTRALINE  MG Oral Tab [Pharmacy Med Name: Sertraline HCl 100 MG Oral Tablet] 90 tablet 0     Sig: TAKE ONE HALF TO ONE TABLET BY MOUTH ONCE DAILY     Refused By: Franca Gillespie

## 2021-04-26 RX ORDER — SERTRALINE HYDROCHLORIDE 100 MG/1
TABLET, FILM COATED ORAL
Qty: 30 TABLET | Refills: 1 | Status: SHIPPED | OUTPATIENT
Start: 2021-04-26 | End: 2021-08-02

## 2021-04-26 NOTE — TELEPHONE ENCOUNTER
From: Nini Ram  To: Jeniffer Gonzalez PA-C  Sent: 4/23/2021 4:05 PM CDT  Subject: Prescription Question    Trinity Bagley,    I hope you and Narendra Gonzalez are doing well and maybe even getting back to normal just a bit?     My hunch is that my prescription

## 2021-05-20 DIAGNOSIS — E78.2 MIXED HYPERLIPIDEMIA: ICD-10-CM

## 2021-05-20 RX ORDER — ROSUVASTATIN CALCIUM 5 MG/1
TABLET, COATED ORAL
Qty: 90 TABLET | Refills: 0 | Status: SHIPPED | OUTPATIENT
Start: 2021-05-20 | End: 2021-08-02

## 2021-05-20 NOTE — TELEPHONE ENCOUNTER
Pt requesting refill of Rosuvastatin    Passed protocol, refill approved, sent to pharmacy.      Appt scheduled on 6/9/21

## 2021-06-09 ENCOUNTER — PATIENT MESSAGE (OUTPATIENT)
Dept: FAMILY MEDICINE CLINIC | Facility: CLINIC | Age: 61
End: 2021-06-09

## 2021-06-09 NOTE — TELEPHONE ENCOUNTER
From: Savage Serra  To: Bailee Escalante PA-C  Sent: 6/9/2021 11:35 AM CDT  Subject: Other    Hi Dona Asha,    Hopefully, you got the message re. my appt. cancellation this morning. (Left voice mail around 7:30.)    Woke up with a weird dizzy spell.  It

## 2021-08-02 ENCOUNTER — OFFICE VISIT (OUTPATIENT)
Dept: FAMILY MEDICINE CLINIC | Facility: CLINIC | Age: 61
End: 2021-08-02
Payer: COMMERCIAL

## 2021-08-02 VITALS
WEIGHT: 119.38 LBS | HEART RATE: 78 BPM | BODY MASS INDEX: 20.38 KG/M2 | SYSTOLIC BLOOD PRESSURE: 102 MMHG | TEMPERATURE: 98 F | OXYGEN SATURATION: 98 % | DIASTOLIC BLOOD PRESSURE: 60 MMHG | RESPIRATION RATE: 18 BRPM | HEIGHT: 64.09 IN

## 2021-08-02 DIAGNOSIS — M85.852 OSTEOPENIA OF LEFT HIP: ICD-10-CM

## 2021-08-02 DIAGNOSIS — Z13.29 SCREENING FOR ENDOCRINE, NUTRITIONAL, METABOLIC AND IMMUNITY DISORDER: ICD-10-CM

## 2021-08-02 DIAGNOSIS — E89.0 HISTORY OF PARTIAL THYROIDECTOMY: ICD-10-CM

## 2021-08-02 DIAGNOSIS — Z13.21 SCREENING FOR ENDOCRINE, NUTRITIONAL, METABOLIC AND IMMUNITY DISORDER: ICD-10-CM

## 2021-08-02 DIAGNOSIS — E78.2 MIXED HYPERLIPIDEMIA: ICD-10-CM

## 2021-08-02 DIAGNOSIS — Z12.31 ENCOUNTER FOR SCREENING MAMMOGRAM FOR MALIGNANT NEOPLASM OF BREAST: ICD-10-CM

## 2021-08-02 DIAGNOSIS — Z13.228 SCREENING FOR ENDOCRINE, NUTRITIONAL, METABOLIC AND IMMUNITY DISORDER: ICD-10-CM

## 2021-08-02 DIAGNOSIS — M85.89 OSTEOPENIA OF MULTIPLE SITES: ICD-10-CM

## 2021-08-02 DIAGNOSIS — Z13.820 OSTEOPOROSIS SCREENING: ICD-10-CM

## 2021-08-02 DIAGNOSIS — F41.1 GAD (GENERALIZED ANXIETY DISORDER): ICD-10-CM

## 2021-08-02 DIAGNOSIS — Z13.0 SCREENING FOR ENDOCRINE, NUTRITIONAL, METABOLIC AND IMMUNITY DISORDER: ICD-10-CM

## 2021-08-02 DIAGNOSIS — Z12.4 SCREENING FOR CERVICAL CANCER: ICD-10-CM

## 2021-08-02 DIAGNOSIS — Z13.0 SCREENING FOR IRON DEFICIENCY ANEMIA: ICD-10-CM

## 2021-08-02 DIAGNOSIS — Z79.899 MEDICATION MANAGEMENT: ICD-10-CM

## 2021-08-02 DIAGNOSIS — Z01.419 WELL FEMALE EXAM WITH ROUTINE GYNECOLOGICAL EXAM: Primary | ICD-10-CM

## 2021-08-02 DIAGNOSIS — H60.543 ECZEMA OF BOTH EXTERNAL EARS: ICD-10-CM

## 2021-08-02 PROCEDURE — 88175 CYTOPATH C/V AUTO FLUID REDO: CPT | Performed by: FAMILY MEDICINE

## 2021-08-02 PROCEDURE — 99396 PREV VISIT EST AGE 40-64: CPT | Performed by: FAMILY MEDICINE

## 2021-08-02 PROCEDURE — 87624 HPV HI-RISK TYP POOLED RSLT: CPT | Performed by: FAMILY MEDICINE

## 2021-08-02 PROCEDURE — 3008F BODY MASS INDEX DOCD: CPT | Performed by: FAMILY MEDICINE

## 2021-08-02 PROCEDURE — 99213 OFFICE O/P EST LOW 20 MIN: CPT | Performed by: FAMILY MEDICINE

## 2021-08-02 PROCEDURE — 3078F DIAST BP <80 MM HG: CPT | Performed by: FAMILY MEDICINE

## 2021-08-02 PROCEDURE — 3074F SYST BP LT 130 MM HG: CPT | Performed by: FAMILY MEDICINE

## 2021-08-02 RX ORDER — ROSUVASTATIN CALCIUM 5 MG/1
5 TABLET, COATED ORAL NIGHTLY
Qty: 90 TABLET | Refills: 0 | Status: SHIPPED | OUTPATIENT
Start: 2021-08-02 | End: 2021-11-11

## 2021-08-02 RX ORDER — SERTRALINE HYDROCHLORIDE 100 MG/1
TABLET, FILM COATED ORAL
Qty: 90 TABLET | Refills: 1 | Status: SHIPPED | OUTPATIENT
Start: 2021-08-02

## 2021-08-02 RX ORDER — CX-024414 0.2 MG/ML
INJECTION, SUSPENSION INTRAMUSCULAR
COMMUNITY
Start: 2021-04-14 | End: 2021-08-02 | Stop reason: ALTCHOICE

## 2021-08-02 NOTE — PROGRESS NOTES
HPI:   Alexis Dumas is a 62year old female who presents for a complete physical exam.   Symptoms: denies discharge, itching, burning or dysuria, is menopausal. No vaginal bleeding    COVID vaccine  UTD  Dental exams  UTD  Eye exams UTD  PHQ2 is 0  Sle Readings from Last 6 Encounters:  08/02/21 : 119 lb 6.4 oz (54.2 kg)  02/14/20 : 121 lb (54.9 kg)  04/18/19 : 119 lb (54 kg)  09/11/18 : 117 lb (53.1 kg)  04/06/18 : 114 lb (51.7 kg)  10/26/17 : 114 lb (51.7 kg)    Body mass index is 20.44 kg/m².        Res 2014    Due 2024; Kerline Castellano M.D.   • COLONOSCOPY     • MAMMOGRAM, BOTH BREASTS  Approx.     • MAMMOGRAM, BOTH BREASTS  2017   •   ,    • OTHER SURGICAL HISTORY      Hydrothermal ablation, uterus   • SCREENING PA denies temperature intolerance, polyuria, or excessive sweating.   LYMPHATICS: denies swollen glands      EXAM:   /60 (BP Location: Left arm, Patient Position: Sitting, Cuff Size: adult)   Pulse 78   Temp 97.8 °F (36.6 °C) (Temporal)   Resp 18   Ht 5' encounter. Meds & Refills for this Visit:  Requested Prescriptions     Pending Prescriptions Disp Refills   • rosuvastatin 5 MG Oral Tab 90 tablet 0     Sig: Take 1 tablet (5 mg total) by mouth nightly.        Imaging & Consults:  ADRIAN MUSTAFA 2D+3D SCREEN Future    9. History of partial thyroidectomy secondary to thyroid nodule benign    - TSH W REFLEX TO FREE T4    10. Encounter for screening mammogram for malignant neoplasm of breast    - [facility] RAMSEY 2D+3D SCREENING BILAT (CPT=77067/02028); Future    11.  Scr

## 2021-08-03 LAB — HPV I/H RISK 1 DNA SPEC QL NAA+PROBE: NEGATIVE

## 2021-08-16 DIAGNOSIS — B00.1 COLD SORE: ICD-10-CM

## 2021-08-17 RX ORDER — VALACYCLOVIR HYDROCHLORIDE 1 G/1
TABLET, FILM COATED ORAL
Qty: 20 TABLET | Refills: 0 | Status: SHIPPED | OUTPATIENT
Start: 2021-08-17

## 2021-08-17 NOTE — TELEPHONE ENCOUNTER
Pt requesting refill of VALACYCLOVIR    Passed protocol, refill approved, sent to pharmacy. Last Office Visit with Provider: 8/2/21  No future appointments.

## 2021-08-20 LAB
ABSOLUTE BASOPHILS: 22 CELLS/UL (ref 0–200)
ABSOLUTE EOSINOPHILS: 88 CELLS/UL (ref 15–500)
ABSOLUTE LYMPHOCYTES: 1681 CELLS/UL (ref 850–3900)
ABSOLUTE MONOCYTES: 392 CELLS/UL (ref 200–950)
ABSOLUTE NEUTROPHILS: 2218 CELLS/UL (ref 1500–7800)
ALBUMIN/GLOBULIN RATIO: 1.8 (CALC) (ref 1–2.5)
ALBUMIN: 4.6 G/DL (ref 3.6–5.1)
ALKALINE PHOSPHATASE: 76 U/L (ref 37–153)
ALT: 29 U/L (ref 6–29)
AST: 36 U/L (ref 10–35)
BASOPHILS: 0.5 %
BILIRUBIN, TOTAL: 0.4 MG/DL (ref 0.2–1.2)
BUN: 22 MG/DL (ref 7–25)
CALCIUM: 9.6 MG/DL (ref 8.6–10.4)
CARBON DIOXIDE: 30 MMOL/L (ref 20–32)
CHLORIDE: 104 MMOL/L (ref 98–110)
CHOL/HDLC RATIO: 2.6 (CALC)
CHOLESTEROL, TOTAL: 187 MG/DL
CREATININE: 0.83 MG/DL (ref 0.5–0.99)
EGFR IF AFRICN AM: 89 ML/MIN/1.73M2
EGFR IF NONAFRICN AM: 77 ML/MIN/1.73M2
EOSINOPHILS: 2 %
GLOBULIN: 2.5 G/DL (CALC) (ref 1.9–3.7)
GLUCOSE: 88 MG/DL (ref 65–99)
HDL CHOLESTEROL: 73 MG/DL
HEMATOCRIT: 39.4 % (ref 35–45)
HEMOGLOBIN: 12.9 G/DL (ref 11.7–15.5)
LDL-CHOLESTEROL: 89 MG/DL (CALC)
LYMPHOCYTES: 38.2 %
MCH: 30.4 PG (ref 27–33)
MCHC: 32.7 G/DL (ref 32–36)
MCV: 92.7 FL (ref 80–100)
MONOCYTES: 8.9 %
MPV: 9.8 FL (ref 7.5–12.5)
NEUTROPHILS: 50.4 %
NON-HDL CHOLESTEROL: 114 MG/DL (CALC)
PLATELET COUNT: 201 THOUSAND/UL (ref 140–400)
POTASSIUM: 4.9 MMOL/L (ref 3.5–5.3)
PROTEIN, TOTAL: 7.1 G/DL (ref 6.1–8.1)
RDW: 12.7 % (ref 11–15)
RED BLOOD CELL COUNT: 4.25 MILLION/UL (ref 3.8–5.1)
SODIUM: 141 MMOL/L (ref 135–146)
TRIGLYCERIDES: 146 MG/DL
TSH W/REFLEX TO FT4: 4 MIU/L (ref 0.4–4.5)
WHITE BLOOD CELL COUNT: 4.4 THOUSAND/UL (ref 3.8–10.8)

## 2021-08-20 NOTE — PROGRESS NOTES
Labs are all normal, except AST. Normal thyroid, lipid panel, complete blood count, kidney function and blood sugar. Liver function test AST mild elevation has been stable over the past year. Continue to eat healthy avoiding processed foods.   Repeat deandre

## 2021-08-23 DIAGNOSIS — R74.8 ELEVATED LIVER ENZYMES: Primary | ICD-10-CM

## 2021-08-26 ENCOUNTER — HOSPITAL ENCOUNTER (OUTPATIENT)
Dept: GENERAL RADIOLOGY | Age: 61
Discharge: HOME | End: 2021-08-26
Attending: FAMILY MEDICINE

## 2021-08-26 ENCOUNTER — HOSPITAL ENCOUNTER (OUTPATIENT)
Dept: MAMMOGRAPHY | Age: 61
Discharge: HOME | End: 2021-08-26
Attending: FAMILY MEDICINE

## 2021-08-26 DIAGNOSIS — Z12.31 ENCOUNTER FOR SCREENING MAMMOGRAM FOR MALIGNANT NEOPLASM OF BREAST: ICD-10-CM

## 2021-08-26 DIAGNOSIS — M85.852 OSTEOPENIA OF LEFT HIP: ICD-10-CM

## 2021-08-26 DIAGNOSIS — Z13.820 SCREENING FOR OSTEOPOROSIS: ICD-10-CM

## 2021-08-26 DIAGNOSIS — M85.89 OSTEOPENIA OF MULTIPLE SITES: ICD-10-CM

## 2021-08-26 PROCEDURE — 77063 BREAST TOMOSYNTHESIS BI: CPT

## 2021-08-26 PROCEDURE — 77080 DXA BONE DENSITY AXIAL: CPT

## 2021-08-27 ENCOUNTER — TELEPHONE (OUTPATIENT)
Dept: FAMILY MEDICINE CLINIC | Facility: CLINIC | Age: 61
End: 2021-08-27

## 2021-08-27 NOTE — TELEPHONE ENCOUNTER
The patient stated that she was already aware of her Mammogram results, but not her Bone Density. However, it was reiterated that her Mammogram was normal and should be repeated in one year.   The patient was also informed that her Bone Density results oriana

## 2021-11-11 DIAGNOSIS — E78.2 MIXED HYPERLIPIDEMIA: ICD-10-CM

## 2021-11-11 RX ORDER — ROSUVASTATIN CALCIUM 5 MG/1
TABLET, COATED ORAL
Qty: 90 TABLET | Refills: 2 | Status: SHIPPED | OUTPATIENT
Start: 2021-11-11

## 2021-11-11 NOTE — TELEPHONE ENCOUNTER
Requested Prescriptions     Signed Prescriptions Disp Refills   • ROSUVASTATIN 5 MG Oral Tab 90 tablet 2     Sig: Take 1 tablet by mouth nightly     Authorizing Provider: Eddie Taylor     Ordering User: Jason Cai     Met protocol. Refill sent.

## 2022-04-01 RX ORDER — SERTRALINE HYDROCHLORIDE 100 MG/1
TABLET, FILM COATED ORAL
Qty: 90 TABLET | Refills: 0 | Status: SHIPPED | OUTPATIENT
Start: 2022-04-01

## 2022-04-01 NOTE — TELEPHONE ENCOUNTER
Pt requesting refill of Sertraline HCl 100 MG Oral Tablet    Last Time Medication was Prescribed :  08/02/2021 qty # 90 with 1 refill    Last Office Visit with Provider: 08/02/2021    No future appointments.

## 2022-07-05 DIAGNOSIS — B00.1 COLD SORE: ICD-10-CM

## 2022-07-06 RX ORDER — VALACYCLOVIR HYDROCHLORIDE 1 G/1
TABLET, FILM COATED ORAL
Qty: 20 TABLET | Refills: 0 | Status: SHIPPED | OUTPATIENT
Start: 2022-07-06

## 2022-08-10 ENCOUNTER — TELEPHONE (OUTPATIENT)
Dept: FAMILY MEDICINE CLINIC | Facility: CLINIC | Age: 62
End: 2022-08-10

## 2022-08-10 ENCOUNTER — OFFICE VISIT (OUTPATIENT)
Dept: FAMILY MEDICINE CLINIC | Facility: CLINIC | Age: 62
End: 2022-08-10
Payer: COMMERCIAL

## 2022-08-10 VITALS
OXYGEN SATURATION: 98 % | HEART RATE: 80 BPM | WEIGHT: 121 LBS | SYSTOLIC BLOOD PRESSURE: 94 MMHG | HEIGHT: 64 IN | TEMPERATURE: 98 F | DIASTOLIC BLOOD PRESSURE: 60 MMHG | BODY MASS INDEX: 20.66 KG/M2

## 2022-08-10 DIAGNOSIS — Z13.220 LIPID SCREENING: ICD-10-CM

## 2022-08-10 DIAGNOSIS — Z13.29 SCREENING FOR ENDOCRINE, METABOLIC AND IMMUNITY DISORDER: ICD-10-CM

## 2022-08-10 DIAGNOSIS — E78.2 MIXED HYPERLIPIDEMIA: ICD-10-CM

## 2022-08-10 DIAGNOSIS — Z12.31 VISIT FOR SCREENING MAMMOGRAM: ICD-10-CM

## 2022-08-10 DIAGNOSIS — Z86.19 HISTORY OF COLD SORES: ICD-10-CM

## 2022-08-10 DIAGNOSIS — Z79.899 MEDICATION MANAGEMENT: ICD-10-CM

## 2022-08-10 DIAGNOSIS — Z13.228 SCREENING FOR ENDOCRINE, METABOLIC AND IMMUNITY DISORDER: ICD-10-CM

## 2022-08-10 DIAGNOSIS — Z13.0 SCREENING FOR ENDOCRINE, METABOLIC AND IMMUNITY DISORDER: ICD-10-CM

## 2022-08-10 DIAGNOSIS — N39.3 STRESS INCONTINENCE: ICD-10-CM

## 2022-08-10 DIAGNOSIS — J34.89 NOSTRIL SORE: ICD-10-CM

## 2022-08-10 DIAGNOSIS — F41.1 GAD (GENERALIZED ANXIETY DISORDER): ICD-10-CM

## 2022-08-10 DIAGNOSIS — Z00.00 WELL FEMALE EXAM WITHOUT GYNECOLOGICAL EXAM: Primary | ICD-10-CM

## 2022-08-10 DIAGNOSIS — H60.543 ECZEMA OF BOTH EXTERNAL EARS: ICD-10-CM

## 2022-08-10 PROCEDURE — 99396 PREV VISIT EST AGE 40-64: CPT | Performed by: FAMILY MEDICINE

## 2022-08-10 PROCEDURE — 99213 OFFICE O/P EST LOW 20 MIN: CPT | Performed by: FAMILY MEDICINE

## 2022-08-10 PROCEDURE — 3074F SYST BP LT 130 MM HG: CPT | Performed by: FAMILY MEDICINE

## 2022-08-10 PROCEDURE — 3078F DIAST BP <80 MM HG: CPT | Performed by: FAMILY MEDICINE

## 2022-08-10 PROCEDURE — 3008F BODY MASS INDEX DOCD: CPT | Performed by: FAMILY MEDICINE

## 2022-08-10 RX ORDER — CHOLECALCIFEROL (VITAMIN D3) 50 MCG
1 TABLET ORAL DAILY
COMMUNITY

## 2022-08-10 RX ORDER — VALACYCLOVIR HYDROCHLORIDE 1 G/1
2000 TABLET, FILM COATED ORAL EVERY 12 HOURS
Qty: 20 TABLET | Refills: 2 | Status: SHIPPED | OUTPATIENT
Start: 2022-08-10 | End: 2022-08-11

## 2022-08-10 RX ORDER — ROSUVASTATIN CALCIUM 5 MG/1
5 TABLET, COATED ORAL NIGHTLY
Qty: 90 TABLET | Refills: 3 | Status: SHIPPED | OUTPATIENT
Start: 2022-08-10

## 2022-08-10 RX ORDER — SERTRALINE HYDROCHLORIDE 100 MG/1
TABLET, FILM COATED ORAL DAILY
Qty: 90 TABLET | Refills: 1 | Status: SHIPPED | OUTPATIENT
Start: 2022-08-10

## 2022-08-11 ENCOUNTER — PATIENT MESSAGE (OUTPATIENT)
Dept: FAMILY MEDICINE CLINIC | Facility: CLINIC | Age: 62
End: 2022-08-11

## 2022-08-11 PROBLEM — N39.3 STRESS INCONTINENCE: Status: ACTIVE | Noted: 2022-08-11

## 2022-08-30 ENCOUNTER — HOSPITAL ENCOUNTER (OUTPATIENT)
Dept: MAMMOGRAPHY | Age: 62
Discharge: HOME | End: 2022-08-30
Attending: FAMILY MEDICINE

## 2022-08-30 DIAGNOSIS — Z12.31 VISIT FOR SCREENING MAMMOGRAM: ICD-10-CM

## 2022-08-30 DIAGNOSIS — R79.89 ELEVATED TSH: Primary | ICD-10-CM

## 2022-08-30 PROCEDURE — 77063 BREAST TOMOSYNTHESIS BI: CPT

## 2022-08-31 LAB
ABSOLUTE BASOPHILS: 30 CELLS/UL (ref 0–200)
ABSOLUTE EOSINOPHILS: 138 CELLS/UL (ref 15–500)
ABSOLUTE LYMPHOCYTES: 1750 CELLS/UL (ref 850–3900)
ABSOLUTE MONOCYTES: 378 CELLS/UL (ref 200–950)
ABSOLUTE NEUTROPHILS: 2004 CELLS/UL (ref 1500–7800)
ALBUMIN/GLOBULIN RATIO: 1.9 (CALC) (ref 1–2.5)
ALBUMIN: 4.5 G/DL (ref 3.6–5.1)
ALKALINE PHOSPHATASE: 75 U/L (ref 37–153)
ALT: 29 U/L (ref 6–29)
AST: 27 U/L (ref 10–35)
BASOPHILS: 0.7 %
BILIRUBIN, TOTAL: 0.4 MG/DL (ref 0.2–1.2)
BUN: 15 MG/DL (ref 7–25)
CALCIUM: 9.6 MG/DL (ref 8.6–10.4)
CARBON DIOXIDE: 27 MMOL/L (ref 20–32)
CHLORIDE: 106 MMOL/L (ref 98–110)
CHOL/HDLC RATIO: 2.5 (CALC)
CHOLESTEROL, TOTAL: 174 MG/DL
CREATININE: 0.75 MG/DL (ref 0.5–1.05)
EGFR: 91 ML/MIN/1.73M2
EOSINOPHILS: 3.2 %
GLOBULIN: 2.4 G/DL (CALC) (ref 1.9–3.7)
GLUCOSE: 93 MG/DL (ref 65–99)
HDL CHOLESTEROL: 69 MG/DL
HEMATOCRIT: 39.7 % (ref 35–45)
HEMOGLOBIN: 13.2 G/DL (ref 11.7–15.5)
LDL-CHOLESTEROL: 81 MG/DL (CALC)
LYMPHOCYTES: 40.7 %
MCH: 30.2 PG (ref 27–33)
MCHC: 33.2 G/DL (ref 32–36)
MCV: 90.8 FL (ref 80–100)
MONOCYTES: 8.8 %
MPV: 9.8 FL (ref 7.5–12.5)
NEUTROPHILS: 46.6 %
NON-HDL CHOLESTEROL: 105 MG/DL (CALC)
PLATELET COUNT: 217 THOUSAND/UL (ref 140–400)
POTASSIUM: 4.4 MMOL/L (ref 3.5–5.3)
PROTEIN, TOTAL: 6.9 G/DL (ref 6.1–8.1)
RDW: 15.2 % (ref 11–15)
RED BLOOD CELL COUNT: 4.37 MILLION/UL (ref 3.8–5.1)
SODIUM: 142 MMOL/L (ref 135–146)
T4, FREE: 1 NG/DL (ref 0.8–1.8)
THYROGLOBULIN ANTIBODIES: <1 IU/ML
THYROID PEROXIDASE$ANTIBODIES: 2 IU/ML
TRIGLYCERIDES: 137 MG/DL
TSH: 4.55 MIU/L (ref 0.4–4.5)
WHITE BLOOD CELL COUNT: 4.3 THOUSAND/UL (ref 3.8–10.8)

## 2022-09-01 DIAGNOSIS — R79.89 ELEVATED TSH: Primary | ICD-10-CM

## 2022-09-02 ENCOUNTER — TELEPHONE (OUTPATIENT)
Dept: FAMILY MEDICINE CLINIC | Facility: CLINIC | Age: 62
End: 2022-09-02

## 2022-11-14 ENCOUNTER — PATIENT MESSAGE (OUTPATIENT)
Dept: FAMILY MEDICINE CLINIC | Facility: CLINIC | Age: 62
End: 2022-11-14

## 2022-11-14 DIAGNOSIS — B00.1 COLD SORE: ICD-10-CM

## 2022-11-14 DIAGNOSIS — Z86.19 HISTORY OF COLD SORES: ICD-10-CM

## 2022-11-14 RX ORDER — VALACYCLOVIR HYDROCHLORIDE 1 G/1
TABLET, FILM COATED ORAL
Qty: 20 TABLET | Refills: 0 | OUTPATIENT
Start: 2022-11-14

## 2022-11-15 ENCOUNTER — PATIENT MESSAGE (OUTPATIENT)
Dept: FAMILY MEDICINE CLINIC | Facility: CLINIC | Age: 62
End: 2022-11-15

## 2022-11-15 ENCOUNTER — TELEPHONE (OUTPATIENT)
Dept: FAMILY MEDICINE CLINIC | Facility: CLINIC | Age: 62
End: 2022-11-15

## 2022-11-15 RX ORDER — VALACYCLOVIR HYDROCHLORIDE 1 G/1
2000 TABLET, FILM COATED ORAL EVERY 12 HOURS
Qty: 20 TABLET | Refills: 2 | Status: SHIPPED | OUTPATIENT
Start: 2022-11-15 | End: 2022-11-16

## 2022-11-15 NOTE — TELEPHONE ENCOUNTER
From: Soraya Lozano  To: Robbin Gibbs PA-C  Sent: 11/14/2022 4:58 PM CST  Subject: VALACYCLOVIR 1 G Oral Tab    Hi Jenni, I got a message on [email or web address]: The following prescription(s) renewals have been denied:    - VALACYCLOVIR 1 G Oral Tab [Pharmacy Med Name: valACYclovir HCl 1 GM Oral Tablet]. I'm down to my last dosage (4 horse pills) and was hoping to get a refill for future \"cold sores. \" I don't think the med is listed on [email or web address] but you definitely prescribed it for me a few years ago and have provided one or two refills in the past. Can you help? Thank you!   Lisa Diehl

## 2022-11-21 NOTE — PROGRESS NOTES
CBC and thyroid are normal.  Elevated liver function tests adding a hepatitis panel. Avoid alcohol, processed foods, fatty foods and acetaminophen repeat liver function test in 1 month.   Lipid panel is normal.  Order future tests/medications sent to my ch Satisfactory

## 2023-03-29 ENCOUNTER — PATIENT MESSAGE (OUTPATIENT)
Dept: FAMILY MEDICINE CLINIC | Facility: CLINIC | Age: 63
End: 2023-03-29

## 2023-03-29 RX ORDER — CIPROFLOXACIN 500 MG/1
500 TABLET, FILM COATED ORAL 2 TIMES DAILY
Qty: 6 TABLET | Refills: 0 | Status: SHIPPED | OUTPATIENT
Start: 2023-03-29

## 2023-03-29 RX ORDER — MEFLOQUINE HYDROCHLORIDE 250 MG/1
TABLET ORAL
Qty: 8 TABLET | Refills: 0 | Status: SHIPPED | OUTPATIENT
Start: 2023-03-29

## 2023-03-29 NOTE — TELEPHONE ENCOUNTER
Jenni   Pt last OV 8/10/22    She is asking for scripts for malaria pills and Cipro for her upcoming trip

## 2023-07-28 NOTE — PROGRESS NOTES
Lety Gaines is a 65year old female. HPI:   Lexapro 10 mg makes her tired and helps her sleep better. Patient was started on Lexapro several months ago has gone back and forth from 5 mg up to 10 mg then back to 5 and a back up again to 10.   States t exertion  CARDIOVASCULAR: denies chest pain on exertion  GI: denies abdominal pain and denies heartburn  NEURO: denies headaches  Musculoskeletal: No motor deficits  Psych see above  EXAM:   BP (!) 88/60 (BP Location: Left arm, Patient Position: Sitting, C Discussed if any problems call office immediately especially if gets increased depression, anxiety or any homicidal or suicidal symptoms. Time spent was 25 minutes more than 50% was spent on counseling regarding medical conditions and treatment.    The Ex 31 weeker with NICU stay

## 2023-08-15 DIAGNOSIS — E78.2 MIXED HYPERLIPIDEMIA: ICD-10-CM

## 2023-08-15 RX ORDER — ROSUVASTATIN CALCIUM 5 MG/1
5 TABLET, COATED ORAL NIGHTLY
Qty: 90 TABLET | Refills: 0 | Status: SHIPPED | OUTPATIENT
Start: 2023-08-15

## 2023-09-11 ENCOUNTER — OFFICE VISIT (OUTPATIENT)
Dept: FAMILY MEDICINE CLINIC | Facility: CLINIC | Age: 63
End: 2023-09-11
Payer: COMMERCIAL

## 2023-09-11 DIAGNOSIS — Z12.31 SCREENING MAMMOGRAM FOR BREAST CANCER: ICD-10-CM

## 2023-09-11 DIAGNOSIS — D22.9 MULTIPLE PIGMENTED NEVI: ICD-10-CM

## 2023-09-11 DIAGNOSIS — Z79.899 MEDICATION MANAGEMENT: ICD-10-CM

## 2023-09-11 DIAGNOSIS — Z78.0 POST-MENOPAUSE: ICD-10-CM

## 2023-09-11 DIAGNOSIS — F41.1 GAD (GENERALIZED ANXIETY DISORDER): ICD-10-CM

## 2023-09-11 DIAGNOSIS — H60.543 ECZEMA OF BOTH EXTERNAL EARS: ICD-10-CM

## 2023-09-11 DIAGNOSIS — Z13.220 ENCOUNTER FOR LIPID SCREENING FOR CARDIOVASCULAR DISEASE: ICD-10-CM

## 2023-09-11 DIAGNOSIS — Z28.21 REFUSED INFLUENZA VACCINE: ICD-10-CM

## 2023-09-11 DIAGNOSIS — Z00.00 WELL FEMALE EXAM WITHOUT GYNECOLOGICAL EXAM: Primary | ICD-10-CM

## 2023-09-11 DIAGNOSIS — Z13.228 SCREENING FOR ENDOCRINE, NUTRITIONAL, METABOLIC AND IMMUNITY DISORDER: ICD-10-CM

## 2023-09-11 DIAGNOSIS — Z13.9 ENCOUNTER FOR HEALTH-RELATED SCREENING: ICD-10-CM

## 2023-09-11 DIAGNOSIS — Z13.6 ENCOUNTER FOR LIPID SCREENING FOR CARDIOVASCULAR DISEASE: ICD-10-CM

## 2023-09-11 DIAGNOSIS — Z13.21 SCREENING FOR ENDOCRINE, NUTRITIONAL, METABOLIC AND IMMUNITY DISORDER: ICD-10-CM

## 2023-09-11 DIAGNOSIS — E78.2 MIXED HYPERLIPIDEMIA: ICD-10-CM

## 2023-09-11 DIAGNOSIS — Z13.0 SCREENING FOR ENDOCRINE, NUTRITIONAL, METABOLIC AND IMMUNITY DISORDER: ICD-10-CM

## 2023-09-11 DIAGNOSIS — Z13.29 SCREENING FOR ENDOCRINE, NUTRITIONAL, METABOLIC AND IMMUNITY DISORDER: ICD-10-CM

## 2023-09-11 PROCEDURE — 99213 OFFICE O/P EST LOW 20 MIN: CPT | Performed by: FAMILY MEDICINE

## 2023-09-11 PROCEDURE — 3074F SYST BP LT 130 MM HG: CPT | Performed by: FAMILY MEDICINE

## 2023-09-11 PROCEDURE — 99396 PREV VISIT EST AGE 40-64: CPT | Performed by: FAMILY MEDICINE

## 2023-09-11 PROCEDURE — 3008F BODY MASS INDEX DOCD: CPT | Performed by: FAMILY MEDICINE

## 2023-09-11 PROCEDURE — 3078F DIAST BP <80 MM HG: CPT | Performed by: FAMILY MEDICINE

## 2023-09-11 RX ORDER — SERTRALINE HYDROCHLORIDE 100 MG/1
TABLET, FILM COATED ORAL DAILY
Qty: 90 TABLET | Refills: 1 | Status: SHIPPED | OUTPATIENT
Start: 2023-09-11

## 2023-09-11 RX ORDER — ROSUVASTATIN CALCIUM 5 MG/1
5 TABLET, COATED ORAL NIGHTLY
Qty: 90 TABLET | Refills: 0 | Status: SHIPPED | OUTPATIENT
Start: 2023-09-11

## 2023-09-12 VITALS
RESPIRATION RATE: 18 BRPM | SYSTOLIC BLOOD PRESSURE: 110 MMHG | DIASTOLIC BLOOD PRESSURE: 64 MMHG | WEIGHT: 124 LBS | HEIGHT: 63 IN | TEMPERATURE: 97 F | HEART RATE: 71 BPM | BODY MASS INDEX: 21.97 KG/M2

## 2023-09-12 PROBLEM — B00.1 COLD SORE: Status: RESOLVED | Noted: 2020-02-15 | Resolved: 2023-09-12

## 2023-09-12 PROBLEM — R04.0 NASAL BLEEDING: Status: RESOLVED | Noted: 2020-02-15 | Resolved: 2023-09-12

## 2023-09-14 DIAGNOSIS — Z78.0 POST-MENOPAUSAL: Primary | ICD-10-CM

## 2023-09-14 DIAGNOSIS — Z12.31 SCREENING MAMMOGRAM FOR BREAST CANCER: ICD-10-CM

## 2023-09-26 ENCOUNTER — HOSPITAL ENCOUNTER (OUTPATIENT)
Dept: CT IMAGING | Age: 63
Discharge: HOME | End: 2023-09-26
Attending: FAMILY MEDICINE

## 2023-09-26 ENCOUNTER — HOSPITAL ENCOUNTER (OUTPATIENT)
Dept: GENERAL RADIOLOGY | Age: 63
Discharge: HOME | End: 2023-09-26
Attending: FAMILY MEDICINE

## 2023-09-26 DIAGNOSIS — Z12.31 SCREENING MAMMOGRAM FOR BREAST CANCER: ICD-10-CM

## 2023-09-26 DIAGNOSIS — Z78.0 POST-MENOPAUSAL: ICD-10-CM

## 2023-09-26 PROCEDURE — 77080 DXA BONE DENSITY AXIAL: CPT

## 2023-09-26 PROCEDURE — 77063 BREAST TOMOSYNTHESIS BI: CPT

## 2023-09-29 LAB
ABSOLUTE BASOPHILS: 19 CELLS/UL (ref 0–200)
ABSOLUTE EOSINOPHILS: 100 CELLS/UL (ref 15–500)
ABSOLUTE LYMPHOCYTES: 1484 CELLS/UL (ref 850–3900)
ABSOLUTE MONOCYTES: 366 CELLS/UL (ref 200–950)
ABSOLUTE NEUTROPHILS: 1732 CELLS/UL (ref 1500–7800)
ALBUMIN/GLOBULIN RATIO: 1.7 (CALC) (ref 1–2.5)
ALBUMIN: 4.5 G/DL (ref 3.6–5.1)
ALKALINE PHOSPHATASE: 88 U/L (ref 37–153)
ALT: 41 U/L (ref 6–29)
AST: 38 U/L (ref 10–35)
BASOPHILS: 0.5 %
BILIRUBIN, TOTAL: 0.7 MG/DL (ref 0.2–1.2)
BUN: 17 MG/DL (ref 7–25)
CALCIUM: 9.7 MG/DL (ref 8.6–10.4)
CARBON DIOXIDE: 28 MMOL/L (ref 20–32)
CHLORIDE: 103 MMOL/L (ref 98–110)
CHOL/HDLC RATIO: 3.2 (CALC)
CHOLESTEROL, TOTAL: 183 MG/DL
CREATININE: 0.78 MG/DL (ref 0.5–1.05)
EGFR: 86 ML/MIN/1.73M2
EOSINOPHILS: 2.7 %
GLOBULIN: 2.6 G/DL (CALC) (ref 1.9–3.7)
GLUCOSE: 89 MG/DL (ref 65–99)
HDL CHOLESTEROL: 57 MG/DL
HEMATOCRIT: 41.4 % (ref 35–45)
HEMOGLOBIN: 13.5 G/DL (ref 11.7–15.5)
LDL-CHOLESTEROL: 98 MG/DL (CALC)
LYMPHOCYTES: 40.1 %
MCH: 30.4 PG (ref 27–33)
MCHC: 32.6 G/DL (ref 32–36)
MCV: 93.2 FL (ref 80–100)
MONOCYTES: 9.9 %
MPV: 9.8 FL (ref 7.5–12.5)
NEUTROPHILS: 46.8 %
NON-HDL CHOLESTEROL: 126 MG/DL (CALC)
PLATELET COUNT: 203 THOUSAND/UL (ref 140–400)
POTASSIUM: 4.4 MMOL/L (ref 3.5–5.3)
PROTEIN, TOTAL: 7.1 G/DL (ref 6.1–8.1)
RDW: 12.9 % (ref 11–15)
RED BLOOD CELL COUNT: 4.44 MILLION/UL (ref 3.8–5.1)
SODIUM: 139 MMOL/L (ref 135–146)
T4, FREE: 1 NG/DL (ref 0.8–1.8)
TRIGLYCERIDES: 187 MG/DL
TSH: 4.76 MIU/L (ref 0.4–4.5)
WHITE BLOOD CELL COUNT: 3.7 THOUSAND/UL (ref 3.8–10.8)

## 2023-09-30 DIAGNOSIS — R79.89 ELEVATED TSH: ICD-10-CM

## 2023-09-30 DIAGNOSIS — R79.89 ELEVATED LFTS: Primary | ICD-10-CM

## 2023-10-04 ENCOUNTER — TELEPHONE (OUTPATIENT)
Dept: FAMILY MEDICINE CLINIC | Facility: CLINIC | Age: 63
End: 2023-10-04

## 2023-10-04 NOTE — TELEPHONE ENCOUNTER
Left patient dexa scan results via voicemail along with the recommendations of the PA-C about taking calcium twice daily, Vitamin d  daily and doing weight bearing exercises. Also, mammogram results are normal repeat in one year.

## 2024-01-19 ENCOUNTER — PATIENT MESSAGE (OUTPATIENT)
Dept: FAMILY MEDICINE CLINIC | Facility: CLINIC | Age: 64
End: 2024-01-19

## 2024-01-22 NOTE — TELEPHONE ENCOUNTER
From: Mirta Soriano  To: Jenni Antunez  Sent: 1/19/2024 5:19 PM CST  Subject: Chloroquine    Boris Arteaga and Happy New Year!    I'm headed back to [facility] for a few days in March and I don't see the malaria preventive in my list of meds. Can I get a new script for Chloroquine?    Thanks a bunch!    Mirta

## 2024-01-22 NOTE — TELEPHONE ENCOUNTER
This pt is traveling to [facility] and is requesting malaria prevention tx (chloroquine).   LOV 9/11/23. Please advise, thanks.

## 2024-01-23 RX ORDER — MEFLOQUINE HYDROCHLORIDE 250 MG/1
TABLET ORAL
Qty: 8 TABLET | Refills: 0 | Status: SHIPPED | OUTPATIENT
Start: 2024-01-23

## 2024-02-06 DIAGNOSIS — E78.2 MIXED HYPERLIPIDEMIA: ICD-10-CM

## 2024-02-06 RX ORDER — ROSUVASTATIN CALCIUM 5 MG/1
5 TABLET, COATED ORAL NIGHTLY
Qty: 90 TABLET | Refills: 0 | Status: SHIPPED | OUTPATIENT
Start: 2024-02-06

## 2024-02-06 NOTE — TELEPHONE ENCOUNTER
Requested Prescriptions     Signed Prescriptions Disp Refills    ROSUVASTATIN 5 MG Oral Tab 90 tablet 0     Sig: Take 1 tablet by mouth nightly     Authorizing Provider: MADDI RG     Ordering User: DAVI BAKER     Refilled per protocol/OV notes

## 2024-03-27 DIAGNOSIS — Z86.19 HISTORY OF COLD SORES: ICD-10-CM

## 2024-03-30 ENCOUNTER — PATIENT MESSAGE (OUTPATIENT)
Dept: FAMILY MEDICINE CLINIC | Facility: CLINIC | Age: 64
End: 2024-03-30

## 2024-04-01 DIAGNOSIS — Z86.19 HISTORY OF COLD SORES: ICD-10-CM

## 2024-04-01 RX ORDER — VALACYCLOVIR HYDROCHLORIDE 1 G/1
2000 TABLET, FILM COATED ORAL EVERY 12 HOURS
Qty: 20 TABLET | Refills: 0 | Status: CANCELLED | OUTPATIENT
Start: 2024-04-01

## 2024-04-01 RX ORDER — VALACYCLOVIR HYDROCHLORIDE 1 G/1
2000 TABLET, FILM COATED ORAL EVERY 12 HOURS
Qty: 20 TABLET | Refills: 0 | Status: SHIPPED | OUTPATIENT
Start: 2024-04-01

## 2024-04-01 NOTE — TELEPHONE ENCOUNTER
Requested Prescriptions     Signed Prescriptions Disp Refills    VALACYCLOVIR 1 G Oral Tab 20 tablet 0     Sig: TAKE 2 TABLETS BY MOUTH EVERY 12 HOURS FOR 1 DAY     Authorizing Provider: MADDI RG     Ordering User: DAVI BAKER     Refilled per protocol/OV notes

## 2024-04-01 NOTE — TELEPHONE ENCOUNTER
From: Mirta Soriano  To: Jenni Antunez  Sent: 3/30/2024 9:18 AM CDT  Subject: VALACYCLOVIR 1 G Oral Tab    I have exhausted the blue horse pills and feel the onset of a cold sore. I called [facility] to renew the () prescription a couple days ago. Called back this morning and they said they have not yet received a response. Can you help?    Thank you!

## 2024-05-15 ENCOUNTER — PATIENT MESSAGE (OUTPATIENT)
Dept: FAMILY MEDICINE CLINIC | Facility: CLINIC | Age: 64
End: 2024-05-15

## 2024-05-15 DIAGNOSIS — E78.2 MIXED HYPERLIPIDEMIA: ICD-10-CM

## 2024-05-15 RX ORDER — ROSUVASTATIN CALCIUM 5 MG/1
5 TABLET, COATED ORAL NIGHTLY
Qty: 30 TABLET | Refills: 0 | Status: SHIPPED | OUTPATIENT
Start: 2024-05-15

## 2024-05-15 NOTE — TELEPHONE ENCOUNTER
From: Mirta Soriano  To: Jenni Antunez  Sent: 5/15/2024 10:39 AM CDT  Subject: Labs    I understand I'm past due for lab work. Does [facility] have the order? Thank you.

## 2024-05-28 LAB
ALBUMIN/GLOBULIN RATIO: 1.8 (CALC) (ref 1–2.5)
ALBUMIN: 4.4 G/DL (ref 3.6–5.1)
ALKALINE PHOSPHATASE: 81 U/L (ref 37–153)
ALT: 29 U/L (ref 6–29)
AST: 27 U/L (ref 10–35)
BILIRUBIN, DIRECT: 0.1 MG/DL
BILIRUBIN, INDIRECT: 0.2 MG/DL (CALC) (ref 0.2–1.2)
BILIRUBIN, TOTAL: 0.3 MG/DL (ref 0.2–1.2)
GLOBULIN: 2.5 G/DL (CALC) (ref 1.9–3.7)
PROTEIN, TOTAL: 6.9 G/DL (ref 6.1–8.1)
THYROGLOBULIN ANTIBODIES: <1 IU/ML
THYROID PEROXIDASE$ANTIBODIES: 1 IU/ML
TSH: 7.39 MIU/L (ref 0.4–4.5)

## 2024-06-12 ENCOUNTER — TELEMEDICINE (OUTPATIENT)
Dept: FAMILY MEDICINE CLINIC | Facility: CLINIC | Age: 64
End: 2024-06-12
Payer: COMMERCIAL

## 2024-06-12 DIAGNOSIS — E78.2 MIXED HYPERLIPIDEMIA: Primary | ICD-10-CM

## 2024-06-12 DIAGNOSIS — R79.89 ELEVATED TSH: ICD-10-CM

## 2024-06-12 DIAGNOSIS — Z13.21 SCREENING FOR ENDOCRINE, NUTRITIONAL, METABOLIC AND IMMUNITY DISORDER: ICD-10-CM

## 2024-06-12 DIAGNOSIS — F41.1 GAD (GENERALIZED ANXIETY DISORDER): ICD-10-CM

## 2024-06-12 DIAGNOSIS — Z13.0 SCREENING FOR ENDOCRINE, NUTRITIONAL, METABOLIC AND IMMUNITY DISORDER: ICD-10-CM

## 2024-06-12 DIAGNOSIS — E89.0 HISTORY OF PARTIAL THYROIDECTOMY: ICD-10-CM

## 2024-06-12 DIAGNOSIS — Z13.228 SCREENING FOR ENDOCRINE, NUTRITIONAL, METABOLIC AND IMMUNITY DISORDER: ICD-10-CM

## 2024-06-12 DIAGNOSIS — Z13.29 SCREENING FOR ENDOCRINE, NUTRITIONAL, METABOLIC AND IMMUNITY DISORDER: ICD-10-CM

## 2024-06-12 PROCEDURE — 99213 OFFICE O/P EST LOW 20 MIN: CPT | Performed by: FAMILY MEDICINE

## 2024-06-12 RX ORDER — ROSUVASTATIN CALCIUM 5 MG/1
5 TABLET, COATED ORAL NIGHTLY
Qty: 90 TABLET | Refills: 1 | Status: SHIPPED | OUTPATIENT
Start: 2024-06-12

## 2024-06-14 PROBLEM — R79.89 ELEVATED TSH: Status: ACTIVE | Noted: 2024-06-14

## 2024-06-14 NOTE — PROGRESS NOTES
Mirta Soriano is a 64 year old female.  HPI:   Please note that the following visit was completed using two-way, real-time interactive audio and video communication.  This has been done in good charlie to provide continuity of care in the best interest of the provider-patient relationship,  There are limitations of this visit as no physical exam could be performed.  Every conscious effort was taken to allow for sufficient and adequate time.  This billing was spent on reviewing labs, medications, radiology tests and decision making.  Appropriate medical decision-making and tests are ordered as detailed in the plan of care above.      Audiovideo call with patient to discuss abnormal thyroid testing/HAN and hyperlipidemia.    ----Abnormal TSH  History of thyroidectomy partial 1989 has had normal thyroid testing until  Thyroid antibodies are negative  Done 5/24/2024  Patient denies any hypothyroid symptoms has not had any significant hair changes, skin changes bowel movements are normal, mild fatigue though states sometimes it is because she does not have something to do that day.  Sometimes naps depending on her boredom.  She is wanting to avoid medication right now since she has no hypothyroid symptoms willing to repeat thyroid testing in 3 months if continues to elevate then we will go on thyroid medication..  Will be watching for symptoms.  Component      Latest Ref Rng 11/9/2015 3/17/2017 9/26/2018 7/15/2020 8/19/2021 8/29/2022   T3 TOTAL      76 - 181 ng/dL 88  96        T4,Free (Direct)      0.8 - 1.8 ng/dL 1.1  1.2     1.0    TSH      0.40 - 4.50 mIU/L 4.24  2.58  2.43  4.23  4.00  4.55 (H)      Component      Latest Ref Rng 9/28/2023 5/24/2024   T3 TOTAL      76 - 181 ng/dL     T4,Free (Direct)      0.8 - 1.8 ng/dL 1.0     TSH      0.40 - 4.50 mIU/L 4.76 (H)  7.39 (H)       --- HAN   Doing well with sertraline mostly takes 50 mg a day sometimes 100 mg.  Still working from home 15 to 20 hours/week.  Tolerating  well no side effects  Did help with the anxiety and irritability.    Is still working and gets better sleep..   No depression symptoms motivation and energy improved.  No suicidal homicidal ideations.    PHQ-9 is at a 2  Still  taking sertraline 50 mg a day has considered going off of it but does help with the anxiety so waiting until next year..  No side effects with sertraline needs refill  1. Little interest or pleasure in doing things: Not at all  2. Feeling down, depressed, or hopeless: Not at all  3. Trouble falling or staying asleep, or sleeping too much: Not at all  4. Feeling tired or having little energy: More than half the days  5. Poor appetite or overeating: Not at all  6. Feeling bad about yourself - or that you are a failure or have let yourself or your family down: Not at all  7. Trouble concentrating on things, such as reading the newspaper or watching television: Not at all  8. Moving or speaking so slowly that other people could have noticed. Or the opposite - being so fidgety or restless that you have been moving around a lot more than usual: Not at all  9. Thoughts that you would be better off dead, or of hurting yourself in some way: Not at all  PHQ-9 TOTAL SCORE: 2  If you checked off any problems, how difficult have these problems made it for you to do your work, take care of things at home, or get along with other people?: Not difficult at all     ----Hyperlipidemia  [facility] heart 11/17/2018 0 calcifications.   Non-smoker  FH CAD or cancer MGF AMI  Taking rosuvastatin 5 mg Denies any chest pain, SOB, dizziness or fainting.  No swelling in the hands or feet.  No symptoms of PAD.  Family history maternal grandfather acute MI  9/28/2023 elevated liver function tests ALT and AST these returned to normal on 5/24/2024 .        Current Outpatient Medications   Medication Sig Dispense Refill    rosuvastatin 5 MG Oral Tab Take 1 tablet (5 mg total) by mouth nightly. 90 tablet 1    VALACYCLOVIR 1 G Oral  Tab TAKE 2 TABLETS BY MOUTH EVERY 12 HOURS FOR 1 DAY 20 tablet 0    sertraline 100 MG Oral Tab Take 0.5-1 tablets ( mg total) by mouth daily. 90 tablet 1    Calcium Carbonate-Vitamin D3 600-400 MG-UNIT Oral Tab       Cholecalciferol (D3) 50 MCG (2000 UT) Oral Tab Take 1 capsule by mouth daily.      triamcinolone 0.1 % External Ointment Apply 1 Application topically 2 (two) times daily as needed. Apply to external ear canal 30 g 2    B Complex-C-Folic Acid (STRESS B COMPLEX OR) Take 1 tablet by mouth daily.      mefloquine 250 MG Oral Tab Take 250 mg every 7 days starting  2 weeks before travel and take every 7 days while traveling and for 4 weeks after travel (Patient not taking: Reported on 2024) 8 tablet 0      Past Medical History:    Allergic rhinitis    Quit fluticasone propionate due to issues w/long-term use    Anxiety    Taking Sertraline as prescribed    Cold sore    Dry eyes, bilateral    Fracture, clavicle    Hyperlipidemia    Taking low dose Rosuvastatin    Nasal sore    feels scabby in the morning, when she blows there is bloody discharge, & sometimes during the day she has a bloody nose very briefly.      Social History:  Social History     Socioeconomic History    Marital status:    Tobacco Use    Smoking status: Former     Current packs/day: 0.00     Types: Cigarettes     Quit date: 1975     Years since quittin.4     Passive exposure: Never    Smokeless tobacco: Never   Vaping Use    Vaping status: Never Used   Substance and Sexual Activity    Alcohol use: Yes     Alcohol/week: 10.0 standard drinks of alcohol     Types: 10 Glasses of wine per week    Drug use: No   Other Topics Concern     Service No    Blood Transfusions No    Caffeine Concern Yes     Comment: 1-2 cups of coffee daily    Occupational Exposure No    Hobby Hazards No    Sleep Concern No    Stress Concern No    Weight Concern No    Special Diet No    Back Care No    Exercise Yes     Comment: housework  & gardening (seasonally)    Bike Helmet No    Self-Exams No        REVIEW OF SYSTEMS:   GENERAL HEALTH: feels well otherwise  SKIN: denies any unusual skin lesions or rashes  RESPIRATORY: denies shortness of breath with exertion  CARDIOVASCULAR: denies chest pain on exertion  GI: denies abdominal pain and denies heartburn  NEURO: denies headaches  Musculoskeletal: No motor deficits  psych see above  EXAM:   No vitals taken due to tele-visit.  25 minutes time was spent reviewing patient's inquiry, patient's record including prior labs, developing management plan and ordering tests and prescriptions.    Full exam was not done secondary to virtual visit.  Reviewed medications, problem list and allergies.  GENERAL: Patient is speaking in full sentences no increased work in breathing patient is alert and orientated x3.    Psychs patient's mood is happy her affect is normal good communication skills  ASSESSMENT AND PLAN:     Encounter Diagnoses   Name Primary?    Mixed hyperlipidemia Yes    Elevated TSH     History of partial thyroidectomy secondary to thyroid nodule benign     HAN (generalized anxiety disorder)     Screening for endocrine, nutritional, metabolic and immunity disorder        Orders Placed This Encounter   Procedures    CBC With Differential With Platelet    Comp Metabolic Panel (14)    Lipid Panel    TSH and Free T4    Triiodothyronine (T3) Total [E]       Meds & Refills for this Visit:  Requested Prescriptions     Signed Prescriptions Disp Refills    rosuvastatin 5 MG Oral Tab 90 tablet 1     Sig: Take 1 tablet (5 mg total) by mouth nightly.       Imaging & Consults:  None  1. Mixed hyperlipidemia  Continue with low saturated fat diet due for lipid panel 9/28/2024 continue with rosuvastatin 5 mg well-tolerated no side effects    - Lipid Panel; Future  - Lipid Panel  - rosuvastatin 5 MG Oral Tab; Take 1 tablet (5 mg total) by mouth nightly.  Dispense: 90 tablet; Refill: 1    2. Elevated TSH  History of  partial thyroidectomy secondary to thyroid nodule benign  - TSH and Free T4; Future  - Triiodothyronine (T3) Total [E]; Future  - TSH and Free T4  - Triiodothyronine (T3) Total [E]  Discussed hypothyroid symptoms patient is deferring medication at this point wants to repeat thyroid testing 1 more time if TSH increases then she will be willing to start thyroid medication.  Will repeat in September 2024 presently hypothyroid asymptomatic  3. HAN (generalized anxiety disorder)  Continue with sertraline 50 mg daily  Presently stable is considering over the next year to maybe discontinue right now wants to continue it has plenty of prescription at home.    4. Screening for endocrine, nutritional, metabolic and immunity disorder  - CBC With Differential With Platelet; Future  - Comp Metabolic Panel (14); Future  - CBC With Differential With Platelet  - Comp Metabolic Panel (14)      The patient indicates understanding of these issues and agrees to the plan.  The patient is asked to return complete physical September 2024 or as needed.    This note was created by [employer] voice recognition. Errors in content may be related to improper recognition by the system; efforts to review and correct have been done but errors may still exist.  Note to patient: The 21st Century Cures Act makes medical notes like these available to patients in the interest of transparency. However, be advised this is a medical document. It is intended as peer to peer communication. It is written in medical language and may contain abbreviations or verbiage that are unfamiliar. It may appear blunt or direct. Medical documents are intended to carry relevant information, facts as evident, and the clinical opinion of the practitioner.

## 2024-10-09 LAB
ABSOLUTE BASOPHILS: 31 CELLS/UL (ref 0–200)
ABSOLUTE EOSINOPHILS: 129 CELLS/UL (ref 15–500)
ABSOLUTE LYMPHOCYTES: 1619 CELLS/UL (ref 850–3900)
ABSOLUTE MONOCYTES: 390 CELLS/UL (ref 200–950)
ABSOLUTE NEUTROPHILS: 1732 CELLS/UL (ref 1500–7800)
ALBUMIN/GLOBULIN RATIO: 1.8 (CALC) (ref 1–2.5)
ALBUMIN: 4.4 G/DL (ref 3.6–5.1)
ALKALINE PHOSPHATASE: 70 U/L (ref 37–153)
ALT: 21 U/L (ref 6–29)
AST: 25 U/L (ref 10–35)
BASOPHILS: 0.8 %
BILIRUBIN, TOTAL: 0.4 MG/DL (ref 0.2–1.2)
BUN: 13 MG/DL (ref 7–25)
CALCIUM: 9.5 MG/DL (ref 8.6–10.4)
CARBON DIOXIDE: 26 MMOL/L (ref 20–32)
CHLORIDE: 106 MMOL/L (ref 98–110)
CHOL/HDLC RATIO: 2.2 (CALC)
CHOLESTEROL, TOTAL: 163 MG/DL
CREATININE: 0.82 MG/DL (ref 0.5–1.05)
EGFR: 80 ML/MIN/1.73M2
EOSINOPHILS: 3.3 %
GLOBULIN: 2.5 G/DL (CALC) (ref 1.9–3.7)
GLUCOSE: 94 MG/DL (ref 65–99)
HDL CHOLESTEROL: 74 MG/DL
HEMATOCRIT: 39.8 % (ref 35–45)
HEMOGLOBIN: 12.8 G/DL (ref 11.7–15.5)
LDL-CHOLESTEROL: 69 MG/DL (CALC)
LYMPHOCYTES: 41.5 %
MCH: 31.3 PG (ref 27–33)
MCHC: 32.2 G/DL (ref 32–36)
MCV: 97.3 FL (ref 80–100)
MONOCYTES: 10 %
MPV: 9.7 FL (ref 7.5–12.5)
NEUTROPHILS: 44.4 %
NON-HDL CHOLESTEROL: 89 MG/DL (CALC)
PLATELET COUNT: 233 THOUSAND/UL (ref 140–400)
POTASSIUM: 4.3 MMOL/L (ref 3.5–5.3)
PROTEIN, TOTAL: 6.9 G/DL (ref 6.1–8.1)
RDW: 12.8 % (ref 11–15)
RED BLOOD CELL COUNT: 4.09 MILLION/UL (ref 3.8–5.1)
SODIUM: 142 MMOL/L (ref 135–146)
T3, TOTAL: 114 NG/DL (ref 76–181)
T4, FREE: 1.1 NG/DL (ref 0.8–1.8)
TRIGLYCERIDES: 115 MG/DL
TSH: 5.14 MIU/L (ref 0.4–4.5)
WHITE BLOOD CELL COUNT: 3.9 THOUSAND/UL (ref 3.8–10.8)

## 2024-11-14 ENCOUNTER — OFFICE VISIT (OUTPATIENT)
Dept: FAMILY MEDICINE CLINIC | Facility: CLINIC | Age: 64
End: 2024-11-14
Payer: COMMERCIAL

## 2024-11-14 VITALS
WEIGHT: 122 LBS | HEART RATE: 84 BPM | BODY MASS INDEX: 21.35 KG/M2 | RESPIRATION RATE: 16 BRPM | DIASTOLIC BLOOD PRESSURE: 68 MMHG | OXYGEN SATURATION: 98 % | HEIGHT: 63.5 IN | TEMPERATURE: 98 F | SYSTOLIC BLOOD PRESSURE: 108 MMHG

## 2024-11-14 DIAGNOSIS — M85.89 OSTEOPENIA OF MULTIPLE SITES: ICD-10-CM

## 2024-11-14 DIAGNOSIS — F17.210 SMOKING GREATER THAN 25 PACK YEARS: ICD-10-CM

## 2024-11-14 DIAGNOSIS — E78.2 MIXED HYPERLIPIDEMIA: ICD-10-CM

## 2024-11-14 DIAGNOSIS — R79.89 ELEVATED TSH: ICD-10-CM

## 2024-11-14 DIAGNOSIS — Z12.11 COLON CANCER SCREENING: ICD-10-CM

## 2024-11-14 DIAGNOSIS — E89.0 HISTORY OF PARTIAL THYROIDECTOMY: ICD-10-CM

## 2024-11-14 DIAGNOSIS — Z87.891 PERSONAL HISTORY OF SMOKING: ICD-10-CM

## 2024-11-14 DIAGNOSIS — Z79.899 MEDICATION MANAGEMENT: ICD-10-CM

## 2024-11-14 DIAGNOSIS — Z28.21 REFUSED INFLUENZA VACCINE: ICD-10-CM

## 2024-11-14 DIAGNOSIS — F41.1 GAD (GENERALIZED ANXIETY DISORDER): ICD-10-CM

## 2024-11-14 DIAGNOSIS — Z00.00 WELL FEMALE EXAM WITHOUT GYNECOLOGICAL EXAM: Primary | ICD-10-CM

## 2024-11-14 DIAGNOSIS — Z12.31 VISIT FOR SCREENING MAMMOGRAM: ICD-10-CM

## 2024-11-14 PROBLEM — M85.852 OSTEOPENIA OF LEFT HIP: Status: RESOLVED | Noted: 2018-09-12 | Resolved: 2024-11-14

## 2024-11-14 PROBLEM — N39.3 STRESS INCONTINENCE: Status: RESOLVED | Noted: 2022-08-11 | Resolved: 2024-11-14

## 2024-11-14 RX ORDER — ROSUVASTATIN CALCIUM 5 MG/1
5 TABLET, COATED ORAL NIGHTLY
Qty: 90 TABLET | Refills: 3 | Status: SHIPPED | OUTPATIENT
Start: 2024-11-14

## 2024-11-14 RX ORDER — SERTRALINE HYDROCHLORIDE 100 MG/1
TABLET, FILM COATED ORAL DAILY
Qty: 90 TABLET | Refills: 3 | Status: SHIPPED | OUTPATIENT
Start: 2024-11-14

## 2024-11-14 NOTE — PROGRESS NOTES
HPI:   Mirta Soriano is a 64 year old female who presents for a complete physical exam follow-up on anxiety and cholesterol medication.     ---Complete physical  Preventative care  9/26/2023 bone density results lumbar spine -0.9, left hip -1.0, femoral neck -1.6   9/26/2023 mammogram dense breasts otherwise normal done at [facility] needs a printed order first cousin breast cancer    11/17/2018 heart scan 0 calcification  10/8/2024 labs T3, TSH, T4 free, lipid, CMP, CBC  Dental exams  UTD  Eye exams due   Immunizations Shingrix completed, Tdap 11/30/2015, deferring further COVID and flu vaccines  Last colonoscopy done 8/22/2014 due 8/22/2024 no FH colon cancer needs referral    Sleep Apnea  Mild snoring no apnea witnessed     --Hypothyroidism  Has never been on levothyroxine and wants to avoid medication   TSH did decrease from last time no hypothyroid symptoms at this point 10/8/2024 TSH 5.14  Partial thyroidectomy secondary to thyroid nodule benign  thyroid function normal since  Component      Latest Ref Rng 11/9/2015 3/17/2017 9/26/2018 7/15/2020 8/19/2021 8/29/2022   TSH      0.40 - 4.50 mIU/L 4.24  2.58  2.43  4.23  4.00  4.55 (H)    T4,Free (Direct)      0.8 - 1.8 ng/dL 1.1  1.2     1.0    T3 TOTAL      76 - 181 ng/dL 88  96          Component      Latest Ref Rng 9/28/2023 5/24/2024 10/8/2024   TSH      0.40 - 4.50 mIU/L 4.76 (H)  7.39 (H)  5.14 (H)    T4,Free (Direct)      0.8 - 1.8 ng/dL 1.0   1.1    T3 TOTAL      76 - 181 ng/dL   114       ---GYNE   Symptoms: denies discharge, itching, burning or dysuria, is menopausal. No vaginal bleeding  Had ablation in 2011.  No bleeding since 2012  Last pap done  Last year 8/2/21 due in 5 years normal pap negative HPV, all normal never needed colposcopy  Some stress incontinance better does Holly's helping stable   but not sexually active  Bone density @ [facility] osteopenia 9/26/2023 results lumbar spine -0.9, left hip -1.0, femoral neck  -1.6 taking calcium, vitamin D   9/26/2023 mammogram dense breasts otherwise normal done at [facility] needs a printed order first cousin breast cancer    Sleep or emotional difficulty: improved.  No sleep apnea witnessed  STD history none     HAN   Refill sertraline mostly takes 50 mg a day sometimes 100. Mg requests refill. Tolerating well no side effects  Did help with the anxiety and irritability.  Is still working and gets better sleep.. No depression symptoms motivation and energy improved.  No suicidal homicidal ideations.  Would like to continue with the 100 mg in case she needs to go up to 100 mg sometimes the mood does fluctuate.    pH Q9 is at a 2    HAN-7 is at a 0  1. Little interest or pleasure in doing things: Not at all  2. Feeling down, depressed, or hopeless: Not at all  3. Trouble falling or staying asleep, or sleeping too much: Not at all  4. Feeling tired or having little energy: More than half the days  5. Poor appetite or overeating: Not at all  6. Feeling bad about yourself - or that you are a failure or have let yourself or your family down: Not at all  7. Trouble concentrating on things, such as reading the newspaper or watching television: Not at all  8. Moving or speaking so slowly that other people could have noticed. Or the opposite - being so fidgety or restless that you have been moving around a lot more than usual: Not at all  9. Thoughts that you would be better off dead, or of hurting yourself in some way: Not at all  PHQ-9 TOTAL SCORE: 2  If you checked off any problems, how difficult have these problems made it for you to do your work, take care of things at home, or get along with other people?: Not difficult at all      Hyperlipidemia  Taking rosuvastatin 5 mg   Denies any chest pain, SOB, dizziness or fainting.  No swelling in the hands or feet.  No symptoms of PAD.  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2 kg)   08/10/22 121 lb (54.9 kg)   08/02/21 119 lb 6.4 oz (54.2 kg)   02/14/20 121 lb (54.9 kg)   04/18/19 119 lb (54 kg)     Body mass index is 21.27 kg/m².       Results for orders placed or performed in visit on 06/12/24   CBC With Differential With Platelet    Collection Time: 10/08/24  8:25 AM   Result Value Ref Range    WHITE BLOOD CELL COUNT 3.9 3.8 - 10.8 Thousand/uL    RED BLOOD CELL COUNT 4.09 3.80 - 5.10 Million/uL    HEMOGLOBIN 12.8 11.7 - 15.5 g/dL    HEMATOCRIT 39.8 35.0 - 45.0 %    MCV 97.3 80.0 - 100.0 fL    MCH 31.3 27.0 - 33.0 pg    MCHC 32.2 32.0 - 36.0 g/dL    RDW 12.8 11.0 - 15.0 %    PLATELET COUNT 233 140 - 400 Thousand/uL    MPV 9.7 7.5 - 12.5 fL    ABSOLUTE NEUTROPHILS 1,732 1,500 - 7,800 cells/uL    ABSOLUTE LYMPHOCYTES 1,619 850 - 3,900 cells/uL    ABSOLUTE MONOCYTES 390 200 - 950 cells/uL    ABSOLUTE EOSINOPHILS 129 15 - 500 cells/uL    ABSOLUTE BASOPHILS 31 0 - 200 cells/uL    NEUTROPHILS 44.4 %    LYMPHOCYTES 41.5 %    MONOCYTES 10.0 %    EOSINOPHILS 3.3 %    BASOPHILS 0.8 %   Comp Metabolic Panel (14)    Collection Time: 10/08/24  8:25 AM   Result Value Ref Range    GLUCOSE 94 65 - 99  mg/dL    UREA NITROGEN (BUN) 13 7 - 25 mg/dL    CREATININE 0.82 0.50 - 1.05 mg/dL    EGFR 80 > OR = 60 mL/min/1.73m2    BUN/CREATININE RATIO SEE NOTE: 6 - 22 (calc)    SODIUM 142 135 - 146 mmol/L    POTASSIUM 4.3 3.5 - 5.3 mmol/L    CHLORIDE 106 98 - 110 mmol/L    CARBON DIOXIDE 26 20 - 32 mmol/L    CALCIUM 9.5 8.6 - 10.4 mg/dL    PROTEIN, TOTAL 6.9 6.1 - 8.1 g/dL    ALBUMIN 4.4 3.6 - 5.1 g/dL    GLOBULIN 2.5 1.9 - 3.7 g/dL (calc)    ALBUMIN/GLOBULIN RATIO 1.8 1.0 - 2.5 (calc)    BILIRUBIN, TOTAL 0.4 0.2 - 1.2 mg/dL    ALKALINE PHOSPHATASE 70 37 - 153 U/L    AST 25 10 - 35 U/L    ALT 21 6 - 29 U/L   Lipid Panel    Collection Time: 10/08/24  8:25 AM   Result Value Ref Range    CHOLESTEROL, TOTAL 163 <200 mg/dL    HDL CHOLESTEROL 74 > OR = 50 mg/dL    TRIGLYCERIDES 115 <150 mg/dL    LDL-CHOLESTEROL 69 mg/dL (calc)    CHOL/HDLC RATIO 2.2 <5.0 (calc)    NON-HDL CHOLESTEROL 89 <130 mg/dL (calc)   TSH and Free T4    Collection Time: 10/08/24  8:25 AM   Result Value Ref Range    TSH 5.14 (H) 0.40 - 4.50 mIU/L    T4, FREE 1.1 0.8 - 1.8 ng/dL   Triiodothyronine (T3) Total [E]    Collection Time: 10/08/24  8:25 AM   Result Value Ref Range    T3, TOTAL 114 76 - 181 ng/dL        Current Outpatient Medications   Medication Sig Dispense Refill    sertraline 100 MG Oral Tab Take 0.5-1 tablets ( mg total) by mouth daily. 90 tablet 3    rosuvastatin 5 MG Oral Tab Take 1 tablet (5 mg total) by mouth nightly. 90 tablet 3    VALACYCLOVIR 1 G Oral Tab TAKE 2 TABLETS BY MOUTH EVERY 12 HOURS FOR 1 DAY 20 tablet 0    Calcium Carbonate-Vitamin D3 600-400 MG-UNIT Oral Tab       Cholecalciferol (D3) 50 MCG (2000 UT) Oral Tab Take 1 capsule by mouth daily.      triamcinolone 0.1 % External Ointment Apply 1 Application topically 2 (two) times daily as needed. Apply to external ear canal 30 g 2    B Complex-C-Folic Acid (STRESS B COMPLEX OR) Take 1 tablet by mouth daily.        Past Medical History:    Allergic rhinitis    Quit fluticasone  propionate due to issues w/long-term use    Anxiety    Taking Sertraline as prescribed    Cold sore    Dry eyes, bilateral    Fracture, clavicle    Hyperlipidemia    Taking low dose Rosuvastatin    Nasal sore    feels scabby in the morning, when she blows there is bloody discharge, & sometimes during the day she has a bloody nose very briefly.      Past Surgical History:   Procedure Laterality Date    Ablation      Hydro [facility]mal [facility]ine [facility]blation; [facility] [place] [place]    Adenoidectomy  1968    Colonoscopy  2014    Due 2024; Kerline Matute M.D.    Colonoscopy      Dexa,bone density,axial skeleton  2021    Mammogram, both breasts  Approx.     Mammogram, both breasts  2017      ,     Other surgical history      Hydrothermal ablation, uterus    Screening pap smear by phys  Approx.     Skin surgery      Thyroid lobectomy,unilat      [facility] in [place], [place]    Tonsillectomy  1968    [place], [place]      Family History   Problem Relation Age of Onset    Other (Liver Failure [Other]) Father     Cancer Father     Suicide History Mother     Heart Attack Maternal Grandfather     High Blood Pressure Sister       Social History:   Social History     Socioeconomic History    Marital status:    Tobacco Use    Smoking status: Former     Current packs/day: 0.00     Types: Cigarettes     Quit date: 1975     Years since quittin.9     Passive exposure: Never    Smokeless tobacco: Never   Vaping Use    Vaping status: Never Used   Substance and Sexual Activity    Alcohol use: Yes     Alcohol/week: 10.0 standard drinks of alcohol     Types: 10 Glasses of wine per week    Drug use: No   Other Topics Concern     Service No    Blood Transfusions No    Caffeine Concern Yes     Comment: 1-2 cups of coffee daily    Occupational Exposure No    Hobby Hazards No    Sleep Concern No    Stress Concern No    Weight Concern No    Special Diet No     Back Care No    Exercise Yes     Comment: housework & gardening (seasonally)    Bike Helmet No    Seat Belt Yes    Self-Exams No     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 Children: 2 boys.   Exercise: minimal.  Diet: watches minimally     REVIEW OF SYSTEMS:   GENERAL: denies fevers, weakness, trouble sleeping or weight changes  SKIN: denies any unusual skin lesions or rashes  EYES:denies vision changes  HEENT: denies upper respiratory symptoms  LUNGS: denies cough or shortness of breath with exertion  CHEST:  denies breast changes or pain  CARDIOVASCULAR: denies chest pain or tightness on exertion: no edema  VASCULAR: denies leg cramps  GI: denies abdominal pain, bowel movement changes, blood in stool  : denies urinary problems, vaginal discharge or discomfort,    MUSCULOSKELETAL: denies joint pain or stiffness  NEURO: denies headaches, tingling or dizziness  PSYCHE: No depression stable anxiety  HEMATOLOGIC: denies bleeding abnormalities  ENDOCRINE: denies temperature intolerance, polyuria, or excessive sweating.  LYMPHATICS: denies swollen glands      EXAM:   /68 (BP Location: Left arm, Patient Position: Sitting, Cuff Size: child)   Pulse 84   Temp 98 °F (36.7 °C) (Temporal)   Resp 16   Ht 5' 3.5\" (1.613 m)   Wt 122 lb (55.3 kg)   SpO2 98%   BMI 21.27 kg/m²   Body mass index is 21.27 kg/m².   GENERAL: well developed, well nourished and in no apparent distress  SKIN: no rashes,no suspicious lesions  HEENT: atraumatic, normocephalic,ears, nose and throat are normal  EYES: PERRLA, EOMI, sclera, conjunctiva are clear  NECK: supple,no adenopathy,no carotid bruits  CHEST: no chest tenderness  BREAST: symmetrical, no suspicious mass, no nipple dimpling or discharge.  LUNGS: clear to auscultation bilateral, no rales, rhonchi or wheezing  CARDIO: RRR without murmur normal S1S2  ABD:  normal bowel sounds,soft, non tender, no masses, HSM or tenderness  : Patient defers asymptomatic    MUSCULOSKELETAL: gait linn,l no gross M/S defect.  EXTREMITIES: no clubbing, cyanosis, or edema  NEURO: oriented times three, cranial nerves are grossly intact, no gross motor or sensory deficit.    ASSESSMENT AND PLAN:   Mirta Soriano is a 64 year old female who presents for a complete physical exam.    Encounter Diagnoses   Name Primary?    Well female exam without gynecological exam Yes    HAN (generalized anxiety disorder)     Mixed hyperlipidemia     Elevated TSH     Osteopenia of multiple sites     History of partial thyroidectomy secondary to thyroid nodule benign     Smoking greater than 25 pack years     Medication management     Personal history of smoking     Refused influenza vaccine     Visit for screening mammogram     Colon cancer screening        Orders Placed This Encounter   Procedures    TSH and Free T4    Influenza Refused       Meds & Refills for this Visit:  Requested Prescriptions     Signed Prescriptions Disp Refills    sertraline 100 MG Oral Tab 90 tablet 3     Sig: Take 0.5-1 tablets ( mg total) by mouth daily.    rosuvastatin 5 MG Oral Tab 90 tablet 3     Sig: Take 1 tablet (5 mg total) by mouth nightly.       Imaging & Consults:  INFLUENZA REFUSED [facility]  GASTRO - EXTERNAL  [facility] RAMSEY 2D+3D SCREENING BILAT (CPT=77067/24447)  CT CHEST LD NO CAD(CPT=71250)  1. Well female exam without gynecological exam  Self breast exam explained. Health maintenance guidance given including vision and dental exams, vitamin D 1,000 iu daily with calcium 500 mg daily.  Lifestyle guidance provided recommended low fat diet and aerobic exercise 30 minutes 3-4 times weekly.        2. HAN (generalized anxiety disorder)  Reviewed medication benefits and side effects.   Continue with sertraline well-tolerated no side effects  - sertraline 100 MG Oral Tab; Take 0.5-1 tablets ( mg total) by mouth daily.  Dispense: 90 tablet; Refill: 3    3. Mixed hyperlipidemia  Reviewed medication benefits and side  effects.     - rosuvastatin 5 MG Oral Tab; Take 1 tablet (5 mg total) by mouth nightly.  Dispense: 90 tablet; Refill: 3    4. Elevated TSH  If any symptoms of hypothyroidism contact office repeat thyroid testing in 6 months  - TSH and Free T4; Future  - TSH and Free T4    5. Osteopenia of multiple sites  Calcium, vitamin D and weightbearing exercise repeat bone density next year    6. History of partial thyroidectomy secondary to thyroid nodule benign  - TSH and Free T4; Future  - TSH and Free T4    7. Smoking greater than 25 pack years  Reviewed benefits of doing CT screening had quit more than 20 years ago was not a candidate for lung cancer screening but can get low-dose chest CT  - CT CHEST LD NO CAD(CPT=71250); Future    8. Medication management  As above    9. Personal history of smoking  - CT CHEST LD NO CAD(CPT=71250); Future    10. Refused influenza vaccine  - Influenza Refused    11. Visit for screening mammogram  - [facility] RAMSEY 2D+3D SCREENING BILAT (CPT=77067/37602); Future  Mammogram order printed and signed for patient  12. Colon cancer screening  - Gastro Referral - External    The patient indicates understanding of these issues and agrees to the plan.  The patient is asked to return for complete physical yearly.

## 2024-11-27 ENCOUNTER — HOSPITAL ENCOUNTER (OUTPATIENT)
Dept: CT IMAGING | Age: 64
Discharge: HOME | End: 2024-11-27
Attending: FAMILY MEDICINE

## 2024-11-27 DIAGNOSIS — Z12.31 ENCOUNTER FOR SCREENING MAMMOGRAM FOR MALIGNANT NEOPLASM OF BREAST: ICD-10-CM

## 2024-11-27 PROCEDURE — 77063 BREAST TOMOSYNTHESIS BI: CPT

## 2024-12-04 ENCOUNTER — TELEPHONE (OUTPATIENT)
Dept: FAMILY MEDICINE CLINIC | Facility: CLINIC | Age: 64
End: 2024-12-04

## 2024-12-04 NOTE — TELEPHONE ENCOUNTER
Called patient to let her know that Jenni Antunez PAC received  her mammogram results  the mammogram was normal however due dense of the breasts they are recommend bilateral ultrasounds which means they would like to do ultrasounds on both breast due to the dense seen on your mammogram. Jenni has placed the ultrasounds orders in your chart if you decide to get the additional testing completed.

## 2024-12-27 DIAGNOSIS — Z86.19 HISTORY OF COLD SORES: ICD-10-CM

## 2024-12-30 RX ORDER — VALACYCLOVIR HYDROCHLORIDE 1 G/1
2000 TABLET, FILM COATED ORAL EVERY 12 HOURS
Qty: 20 TABLET | Refills: 0 | Status: SHIPPED | OUTPATIENT
Start: 2024-12-30

## (undated) DIAGNOSIS — F41.1 GAD (GENERALIZED ANXIETY DISORDER): ICD-10-CM

## (undated) NOTE — MR AVS SNAPSHOT
After Visit Summary   8/2/2021    Anisa Guajardo    MRN: [id number]           Visit Information     Date & Time  8/2/2021  9:00 AM Provider  Nasreen Artis PA-C [address], [facility] [employer].  Phone  [phone] Screening for cervical cancer   [[id number]]    Medication management   [[id number]]             We Ordered the Following     Normal Orders This Visit    CBC WITH DIFFERENTIAL WITH PLATELET [7060533 CUSTOM]     COMP METABOLIC PANEL (14) [0017828 CUSTOM]     HPV HI patient experience and are looking for ways to make improvements. Your feedback will help us do so. For more information on [employer], please visit [email or web address]                   DO YOU KNOW WHERE TO GO?   Injury & Illness coverage  For more information about hours, locations or appointment options available at [facility],   visit [email or web address] or call [phone]. ([phone])

## (undated) NOTE — LETTER
07/03/17        [facility]  [address]      Dear Brian Faust records indicate that you have outstanding lab work and or testing that was ordered for you and has not yet been completed:          Vitamin D, 25-Hydroxy [E]  To

## (undated) NOTE — LETTER
10/11/18        [facility]  [address]      Dear Courtney Montes records indicate that you have outstanding lab work and or testing that was ordered for you and has not yet been completed:           BONE DENSITOMETRY    To provi

## (undated) NOTE — MR AVS SNAPSHOT
[facility]  [phone]               Thank you for choosing us for your health care visit with Jhonny Bonds PA-C.   We are glad to serve you and happy to provide you with [facility] medical examination [Z00.00]           T4 FREE [866] [Q]    Complete by:   Mar 06, 2017 (Approximate)    Assoc Dx:  Laboratory examination ordered as part of a routine general medical examination [T36.66]                 Scheduling Instructions     Monday M depends on your level of risk for breast cancer. If your risk is high, your provider may recommend MRI screening as well.  All women age 36to 79years old, who are in good health, should be screened for breast cancer with mammography every 1 to 2 years aft HIV test: The CDC (Centers for Disease Control) recommends a routine HIV test for people age 15 to 72 at their regular checkups.    Tuberculosis (TB) test: if you have a high risk of TB; for example, because you are a health worker, drug user, or immigrant, complications from the flu, or you might spread the flu to others who are at high risk. Women who will be pregnant during flu season should also have a flu shot.    Measles, mumps, and rubella shot (MMR) if you were born after 1956 unless you have already h cabinets when they are not in use. Install smoke detectors in your home. Set your hot water heater to less than 120°F (49°C). Dental health: Visit your dentist regularly. Brush your teeth with fluoride toothpaste daily. Also floss your teeth daily.    Sex office, you can view your past visit information in [employer] by going to Visits < Visit Summaries. i-drive questions? Call [phone] for help. i-drive is NOT to be used for urgent needs. For medical emergencies, dial 911.         Educational Inform